# Patient Record
Sex: MALE | Race: BLACK OR AFRICAN AMERICAN | Employment: FULL TIME | URBAN - METROPOLITAN AREA
[De-identification: names, ages, dates, MRNs, and addresses within clinical notes are randomized per-mention and may not be internally consistent; named-entity substitution may affect disease eponyms.]

---

## 2019-02-05 ENCOUNTER — HOSPITAL ENCOUNTER (EMERGENCY)
Age: 44
Discharge: HOME | End: 2019-02-05
Attending: EMERGENCY MEDICINE
Payer: COMMERCIAL

## 2019-02-05 ENCOUNTER — APPOINTMENT (OUTPATIENT)
Dept: GENERAL RADIOLOGY | Age: 44
End: 2019-02-05
Attending: PHYSICIAN ASSISTANT
Payer: COMMERCIAL

## 2019-02-05 VITALS
RESPIRATION RATE: 15 BRPM | HEART RATE: 64 BPM | OXYGEN SATURATION: 99 % | DIASTOLIC BLOOD PRESSURE: 69 MMHG | TEMPERATURE: 98.2 F | SYSTOLIC BLOOD PRESSURE: 115 MMHG

## 2019-02-05 DIAGNOSIS — R00.2 PALPITATIONS: Primary | ICD-10-CM

## 2019-02-05 LAB
ALBUMIN SERPL-MCNC: 3.7 G/DL (ref 3.5–5)
ALBUMIN/GLOB SERPL: 0.9 {RATIO} (ref 1.1–2.2)
ALP SERPL-CCNC: 60 U/L (ref 45–117)
ALT SERPL-CCNC: 18 U/L (ref 12–78)
ANION GAP SERPL CALC-SCNC: 7 MMOL/L (ref 5–15)
AST SERPL-CCNC: 23 U/L (ref 15–37)
ATRIAL RATE: 62 BPM
BASOPHILS # BLD: 0 K/UL (ref 0–0.1)
BASOPHILS NFR BLD: 1 % (ref 0–1)
BILIRUB SERPL-MCNC: 0.4 MG/DL (ref 0.2–1)
BUN SERPL-MCNC: 11 MG/DL (ref 6–20)
BUN/CREAT SERPL: 12 (ref 12–20)
CALCIUM SERPL-MCNC: 8.9 MG/DL (ref 8.5–10.1)
CALCULATED P AXIS, ECG09: 84 DEGREES
CALCULATED R AXIS, ECG10: -9 DEGREES
CALCULATED T AXIS, ECG11: 40 DEGREES
CHLORIDE SERPL-SCNC: 106 MMOL/L (ref 97–108)
CO2 SERPL-SCNC: 26 MMOL/L (ref 21–32)
COMMENT, HOLDF: NORMAL
CREAT SERPL-MCNC: 0.93 MG/DL (ref 0.7–1.3)
DIAGNOSIS, 93000: NORMAL
DIFFERENTIAL METHOD BLD: ABNORMAL
EOSINOPHIL # BLD: 0.1 K/UL (ref 0–0.4)
EOSINOPHIL NFR BLD: 2 % (ref 0–7)
ERYTHROCYTE [DISTWIDTH] IN BLOOD BY AUTOMATED COUNT: 14.1 % (ref 11.5–14.5)
GLOBULIN SER CALC-MCNC: 3.9 G/DL (ref 2–4)
GLUCOSE SERPL-MCNC: 81 MG/DL (ref 65–100)
HCT VFR BLD AUTO: 38.1 % (ref 36.6–50.3)
HGB BLD-MCNC: 12 G/DL (ref 12.1–17)
IMM GRANULOCYTES # BLD AUTO: 0 K/UL (ref 0–0.04)
IMM GRANULOCYTES NFR BLD AUTO: 0 % (ref 0–0.5)
LYMPHOCYTES # BLD: 1.3 K/UL (ref 0.8–3.5)
LYMPHOCYTES NFR BLD: 29 % (ref 12–49)
MCH RBC QN AUTO: 27.3 PG (ref 26–34)
MCHC RBC AUTO-ENTMCNC: 31.5 G/DL (ref 30–36.5)
MCV RBC AUTO: 86.8 FL (ref 80–99)
MONOCYTES # BLD: 0.5 K/UL (ref 0–1)
MONOCYTES NFR BLD: 12 % (ref 5–13)
NEUTS SEG # BLD: 2.4 K/UL (ref 1.8–8)
NEUTS SEG NFR BLD: 56 % (ref 32–75)
NRBC # BLD: 0 K/UL (ref 0–0.01)
NRBC BLD-RTO: 0 PER 100 WBC
P-R INTERVAL, ECG05: 148 MS
PLATELET # BLD AUTO: 151 K/UL (ref 150–400)
PMV BLD AUTO: 10.7 FL (ref 8.9–12.9)
POTASSIUM SERPL-SCNC: 4 MMOL/L (ref 3.5–5.1)
PROT SERPL-MCNC: 7.6 G/DL (ref 6.4–8.2)
Q-T INTERVAL, ECG07: 402 MS
QRS DURATION, ECG06: 98 MS
QTC CALCULATION (BEZET), ECG08: 408 MS
RBC # BLD AUTO: 4.39 M/UL (ref 4.1–5.7)
SAMPLES BEING HELD,HOLD: NORMAL
SODIUM SERPL-SCNC: 139 MMOL/L (ref 136–145)
T4 FREE SERPL-MCNC: 1 NG/DL (ref 0.8–1.5)
TROPONIN I SERPL-MCNC: <0.05 NG/ML
TSH SERPL DL<=0.05 MIU/L-ACNC: 4.78 UIU/ML (ref 0.36–3.74)
VENTRICULAR RATE, ECG03: 62 BPM
WBC # BLD AUTO: 4.3 K/UL (ref 4.1–11.1)

## 2019-02-05 PROCEDURE — 99283 EMERGENCY DEPT VISIT LOW MDM: CPT

## 2019-02-05 PROCEDURE — 84439 ASSAY OF FREE THYROXINE: CPT

## 2019-02-05 PROCEDURE — 36415 COLL VENOUS BLD VENIPUNCTURE: CPT

## 2019-02-05 PROCEDURE — 93005 ELECTROCARDIOGRAM TRACING: CPT

## 2019-02-05 PROCEDURE — 71046 X-RAY EXAM CHEST 2 VIEWS: CPT

## 2019-02-05 PROCEDURE — 84484 ASSAY OF TROPONIN QUANT: CPT

## 2019-02-05 PROCEDURE — 85025 COMPLETE CBC W/AUTO DIFF WBC: CPT

## 2019-02-05 PROCEDURE — 84443 ASSAY THYROID STIM HORMONE: CPT

## 2019-02-05 PROCEDURE — 80053 COMPREHEN METABOLIC PANEL: CPT

## 2019-02-05 NOTE — LETTER
[address] 
[facility] [address] 
[phone] Work/School Note Date: 2/5/2019 To Whom It May concern: 
 
Jose Cruz Gee. was seen and treated today in the emergency room by the following provider(s): 
Attending Provider: Jesus Carrera MD 
Physician Assistant: CEZAR Gleason. Jose Cruz Florence may return to work on 2/6/19. Sincerely, CEZAR Giraldo

## 2019-02-05 NOTE — LETTER
[address] 
[address] [place], [place] [address] [address] 
[phone] Work/School Note Date: 2/5/2019 To Whom It May concern: 
 
Sharyn Carney may return to work on 2/6/19 Sincerely, Doyne Duane, PA

## 2019-02-05 NOTE — ED TRIAGE NOTES
2664: Patient arrives for what he describes as a \"skipped beat\" that has been going on for about 8/9 days accompanied with dizziness. Patient states that it happens once every 5 mins. Denies SOB 
 
1113: Patient verbalizes understanding of discharge instructions given by provider, Jose Long. Opportunity for questions provided. Patient in no apparent distress. Patient ambulatory upon discharge. Visit Vitals /69 (BP 1 Location: Left arm, BP Patient Position: At rest;Sitting) Pulse 64 Temp 98.2 °F (36.8 °C) Resp 15 SpO2 99%

## 2019-02-05 NOTE — ED PROVIDER NOTES
37year old male presenting to the ED for palpitations. Happening for over a week. Notes that he feels lightheaded when it happens. States that he has noticed symptoms for about 8 or 9 days. Notes that he feels symptoms currently, and notes that it has been relatively constant for a couple of days. Notes that it feels irregular at times. Feels like skipped beat. Notes an intermittent \"burning\" sensation in the chest at times. No SOB. No hemoptysis. No hx VTE. No recent immobilization. No unilateral leg pain/swelling. Admits to drinking 5-6 [facility] [employer] a day but denies any recent increase. PMHx: denies PSx: denies Social: 1ppd. No alcohol use. No illicit drug use. Palpitations Pertinent negatives include no fever, no vomiting, no cough and no shortness of breath. History reviewed. No pertinent past medical history. History reviewed. No pertinent surgical history. History reviewed. No pertinent family history. Social History Socioeconomic History  Marital status: SINGLE Spouse name: Not on file  Number of children: Not on file  Years of education: Not on file  Highest education level: Not on file Social Needs  Financial resource strain: Not on file  Food insecurity - worry: Not on file  Food insecurity - inability: Not on file  Transportation needs - medical: Not on file  Transportation needs - non-medical: Not on file Occupational History  Not on file Tobacco Use  Smoking status: Current Every Day Smoker Packs/day: 1.00  Smokeless tobacco: Never Used Substance and Sexual Activity  Alcohol use: No  
  Frequency: Never  Drug use: No  
 Sexual activity: Not on file Other Topics Concern  Not on file Social History Narrative  Not on file ALLERGIES: Patient has no known allergies. Review of Systems Constitutional: Negative for fever. HENT: Negative for congestion. Eyes: Negative for discharge and redness. Respiratory: Negative for cough and shortness of breath. Cardiovascular: Positive for palpitations. Gastrointestinal: Negative for diarrhea and vomiting. Genitourinary: Negative for difficulty urinating. Musculoskeletal: Negative for joint swelling. Skin: Negative for wound. Neurological: Negative for syncope. Psychiatric/Behavioral: Negative for behavioral problems. All other systems reviewed and are negative. Vitals:  
 02/05/19 0904 02/05/19 0957 02/05/19 1112 BP:  117/54 115/69 Pulse: 69 65 64 Resp:  26 15 Temp:   98.2 °F (36.8 °C) SpO2: 98% 98% 99% Physical Exam  
Constitutional: He appears well-developed and well-nourished. No distress. Pleasant AA male HENT:  
Right Ear: External ear normal.  
Left Ear: External ear normal.  
Eyes: Pupils are equal, round, and reactive to light. Neck: Normal range of motion. Neck supple. Cardiovascular: Normal rate, regular rhythm and normal heart sounds. Exam reveals no gallop and no friction rub. No murmur heard. Regular pulse Pulmonary/Chest: Effort normal and breath sounds normal. No respiratory distress. He has no wheezes. Abdominal: Soft. There is no tenderness. There is no guarding. Musculoskeletal: Normal range of motion. Neurological: He is alert. Skin: Skin is warm and dry. Psychiatric: He has a normal mood and affect. Nursing note and vitals reviewed. MDM Number of Diagnoses or Management Options Palpitations:  
Diagnosis management comments: 37year old male presenting for over a week of palpitations. Reports feeling of skipped beats. Pulse regular in the ED, normal EKG. Overall reassuring labs, VS.  Discussed possible causes of sensation, encouraged pt to stop drinking 5-6 [employer] [facility] sodas a day as caffeine could be contributing to symptoms. Cardiology f/u given. Amount and/or Complexity of Data Reviewed Clinical lab tests: ordered and reviewed Tests in the radiology section of CPT®: ordered and reviewed Discuss the patient with other providers: yes (Dr. Elijah Manzano, ED attending) Procedures

## 2019-02-05 NOTE — DISCHARGE INSTRUCTIONS
Patient Education     - return for new or worsening symptoms  - make a follow up with primary care and cardiology (Dr. Flaco Jaffe)  - gradually decrease your intake of Mountain Dew     Palpitations: Care Instructions  Your Care Instructions    Heart palpitations are the uncomfortable sensation that your heart is beating fast or irregularly. You might feel pounding or fluttering in your chest. It might feel like your heart is skipping a beat. Although palpitations may be caused by a heart problem, they also occur because of stress, fatigue, or use of alcohol, caffeine, or nicotine. Many medicines, including diet pills, antihistamines, decongestants, and some herbal products, can cause heart palpitations. Nearly everyone has palpitations from time to time. Depending on your symptoms, your doctor may need to do more tests to try to find the cause of your palpitations. Follow-up care is a key part of your treatment and safety. Be sure to make and go to all appointments, and call your doctor if you are having problems. It's also a good idea to know your test results and keep a list of the medicines you take. How can you care for yourself at home? · Avoid caffeine, nicotine, and excess alcohol. · Do not take illegal drugs, such as methamphetamines and cocaine. · Do not take weight loss or diet medicines unless you talk with your doctor first.  · Get plenty of sleep. · Do not overeat. · If you have palpitations again, take deep breaths and try to relax. This may slow a racing heart. · If you start to feel lightheaded, lie down to avoid injuries that might result if you pass out and fall down. · Keep a record of your palpitations and bring it to your next doctor's appointment. Write down:  ? The date and time. ? Your pulse. (If your heart is beating fast, it may be hard to count your pulse.)  ? What you were doing when the palpitations started. ? How long the palpitations lasted. ? Any other symptoms.   · If an activity causes palpitations, slow down or stop. Talk to your doctor before you do that activity again. · Take your medicines exactly as prescribed. Call your doctor if you think you are having a problem with your medicine. When should you call for help? Call 911 anytime you think you may need emergency care. For example, call if:    · You passed out (lost consciousness).     · You have symptoms of a heart attack. These may include:  ? Chest pain or pressure, or a strange feeling in the chest.  ? Sweating. ? Shortness of breath. ? Pain, pressure, or a strange feeling in the back, neck, jaw, or upper belly or in one or both shoulders or arms. ? Lightheadedness or sudden weakness. ? A fast or irregular heartbeat. After you call 911, the  may tell you to chew 1 adult-strength or 2 to 4 low-dose aspirin. Wait for an ambulance. Do not try to drive yourself.     · You have symptoms of a stroke. These may include:  ? Sudden numbness, tingling, weakness, or loss of movement in your face, arm, or leg, especially on only one side of your body. ? Sudden vision changes. ? Sudden trouble speaking. ? Sudden confusion or trouble understanding simple statements. ? Sudden problems with walking or balance. ? A sudden, severe headache that is different from past headaches.    Call your doctor now or seek immediate medical care if:    · You have heart palpitations and:  ? Are dizzy or lightheaded, or you feel like you may faint. ? Have new or increased shortness of breath.    Watch closely for changes in your health, and be sure to contact your doctor if:    · You continue to have heart palpitations. Where can you learn more? Go to http://van-mary.info/. Enter R508 in the search box to learn more about \"Palpitations: Care Instructions. \"  Current as of: July 22, 2018  Content Version: 11.9  © 8488-3154 [employer].  Care instructions adapted under license by [employer] (which disclaims liability or warranty for this information). If you have questions about a medical condition or this instruction, always ask your healthcare professional. [address] any warranty or liability for your use of this information.

## 2019-02-08 ENCOUNTER — CLINICAL SUPPORT (OUTPATIENT)
Dept: CARDIOLOGY CLINIC | Age: 44
End: 2019-02-08

## 2019-02-08 ENCOUNTER — OFFICE VISIT (OUTPATIENT)
Dept: CARDIOLOGY CLINIC | Age: 44
End: 2019-02-08

## 2019-02-08 VITALS
RESPIRATION RATE: 12 BRPM | HEART RATE: 68 BPM | WEIGHT: 161.8 LBS | OXYGEN SATURATION: 98 % | SYSTOLIC BLOOD PRESSURE: 110 MMHG | BODY MASS INDEX: 23.96 KG/M2 | HEIGHT: 69 IN | DIASTOLIC BLOOD PRESSURE: 58 MMHG

## 2019-02-08 DIAGNOSIS — R55 PRE-SYNCOPE: ICD-10-CM

## 2019-02-08 DIAGNOSIS — R00.2 PALPITATION: Primary | ICD-10-CM

## 2019-02-08 DIAGNOSIS — R00.2 PALPITATION: ICD-10-CM

## 2019-02-08 NOTE — PROGRESS NOTES
HISTORY OF PRESENT ILLNESS  Tootie Martin is a 37 y.o. male     SUMMARY:   Problem List  Date Reviewed: 2/8/2019          Codes Class Noted    Palpitation ICD-10-CM: R00.2  ICD-9-CM: 785.1  2/8/2019              No current outpatient medications on file prior to visit. No current facility-administered medications on file prior to visit. CARDIOLOGY STUDIES TO DATE:  none  Chief Complaint   Patient presents with    Palpitations     HPI :  Mr. Zechariah Jerome is a 37year-old referred by the [facility] ER for cardiac evaluation. Over the years, he has had problems with palpitations, which by description sound like they are premature atrial or ventricular contractions. For the last couple of weeks, he was having much more frequent symptoms and finally on Tuesday, it was really frequent and he actually felt like he might faint at work, so he went to the emergency room. His EKG was normal and his labs were unremarkable, except for a slightly elevated TSH. He quit smoking that day and stopped drinking [employer] that same day and things are a little bit better. He drinks no alcohol and gets no regular exercise, though he is fairly active at work and at home. There is no history of hypertension or diabetes. Cholesterol status is unknown. Family history is negative for premature coronary disease. He has some low back pain. He does have stress with a job and young family and so forth. CARDIAC ROS:   negative for chest pain, dyspnea, palpitations, orthopnea, paroxysmal nocturnal dyspnea, exertional chest pressure/discomfort, claudication, lower extremity edema    Family History   Problem Relation Age of Onset    Heart Disease Neg Hx        No past medical history on file. GENERAL ROS:  A comprehensive review of systems was negative except for that written in the HPI.     Visit Vitals  /58 (BP 1 Location: Left arm, BP Patient Position: Sitting)   Pulse 68   Resp 12   Ht 5' 9\" (1.753 m)   Wt 161 lb 12.8 oz (73.4 kg)   SpO2 98%   BMI 23.89 kg/m²       Wt Readings from Last 3 Encounters:   02/08/19 161 lb 12.8 oz (73.4 kg)   06/13/10 150 lb (68 kg)            BP Readings from Last 3 Encounters:   02/08/19 110/58   02/05/19 115/69   06/13/10 122/67       PHYSICAL EXAM  General appearance: alert, cooperative, no distress, appears stated age  Neurologic: Alert and oriented X 3  Neck: supple, symmetrical, trachea midline, no adenopathy, no carotid bruit and no JVD  Lungs: clear to auscultation bilaterally  Heart: regular rate and rhythm, S1, S2 normal, no murmur, click, rub or gallop  Abdomen: soft, non-tender. Bowel sounds normal. No masses,  no organomegaly  Extremities: extremities normal, atraumatic, no cyanosis or edema  Pulses: 2+ and symmetric      ASSESSMENT  We talked about his likely diagnosis and potential causes and the use of medications going forward if it remains problematic for him. I strongly supported his decision to cut out caffeine and tobacco.  Given the fact that he almost fainted the other day, I do think we should get an echocardiogram and provide him with an event monitor. current treatment plan is effective, no change in therapy  lab results and schedule of future lab studies reviewed with patient  reviewed diet, exercise and weight control    Encounter Diagnoses   Name Primary?  Palpitation Yes    Pre-syncope      No orders of the defined types were placed in this encounter. Follow-up Disposition:  Return in about 4 weeks (around 3/8/2019), or if symptoms worsen or fail to improve.     Mindy Sanchez MD  2/8/2019

## 2019-02-08 NOTE — PROGRESS NOTES
Dr. Gabbie Mary would like to have a 30 day event monitor mailed to patient. Dx palpitations, near syncope.

## 2019-02-13 ENCOUNTER — CLINICAL SUPPORT (OUTPATIENT)
Dept: CARDIOLOGY CLINIC | Age: 44
End: 2019-02-13

## 2019-02-13 VITALS
HEIGHT: 69 IN | WEIGHT: 166 LBS | BODY MASS INDEX: 24.59 KG/M2 | SYSTOLIC BLOOD PRESSURE: 110 MMHG | DIASTOLIC BLOOD PRESSURE: 58 MMHG

## 2019-02-13 DIAGNOSIS — R00.2 PALPITATION: ICD-10-CM

## 2019-02-14 ENCOUNTER — TELEPHONE (OUTPATIENT)
Dept: CARDIOLOGY CLINIC | Age: 44
End: 2019-02-14

## 2019-02-14 LAB
ECHO AO ASC DIAM: 2.51 CM
ECHO AO ROOT DIAM: 2.76 CM
ECHO AV MEAN GRADIENT: 5.5 MMHG
ECHO AV PEAK GRADIENT: 10 MMHG
ECHO AV PEAK VELOCITY: 157.88 CM/S
ECHO AV VTI: 31.37 CM
ECHO IVC SNIFF: 1.18 CM
ECHO LA AREA 4C: 20.9 CM2
ECHO LA MAJOR AXIS: 3.1 CM
ECHO LA TO AORTIC ROOT RATIO: 1.12
ECHO LA VOL 2C: 51.55 ML (ref 18–58)
ECHO LA VOL 4C: 59.14 ML (ref 18–58)
ECHO LA VOL BP: 62.87 ML (ref 18–58)
ECHO LA VOL/BSA BIPLANE: 32.94 ML/M2 (ref 16–28)
ECHO LA VOLUME INDEX A2C: 27.01 ML/M2 (ref 16–28)
ECHO LA VOLUME INDEX A4C: 30.99 ML/M2 (ref 16–28)
ECHO LV E' LATERAL VELOCITY: 15.15 CM/S
ECHO LV E' SEPTAL VELOCITY: 14.1 CM/S
ECHO LV EDV A2C: 105.6 ML
ECHO LV EDV A4C: 94.5 ML
ECHO LV EDV BP: 103.8 ML (ref 67–155)
ECHO LV EDV INDEX A4C: 49.5 ML/M2
ECHO LV EDV INDEX BP: 54.4 ML/M2
ECHO LV EDV NDEX A2C: 55.3 ML/M2
ECHO LV EJECTION FRACTION A2C: 52 %
ECHO LV EJECTION FRACTION A4C: 57 %
ECHO LV EJECTION FRACTION BIPLANE: 54.9 % (ref 55–100)
ECHO LV ESV A2C: 50.2 ML
ECHO LV ESV A4C: 40.3 ML
ECHO LV ESV BP: 46.8 ML (ref 22–58)
ECHO LV ESV INDEX A2C: 26.3 ML/M2
ECHO LV ESV INDEX A4C: 21.1 ML/M2
ECHO LV ESV INDEX BP: 24.5 ML/M2
ECHO LV INTERNAL DIMENSION DIASTOLIC: 4.91 CM (ref 4.2–5.9)
ECHO LV INTERNAL DIMENSION SYSTOLIC: 2.88 CM
ECHO LV IVSD: 0.84 CM (ref 0.6–1)
ECHO LV MASS 2D: 165.8 G (ref 88–224)
ECHO LV MASS INDEX 2D: 86.9 G/M2 (ref 49–115)
ECHO LV POSTERIOR WALL DIASTOLIC: 0.88 CM (ref 0.6–1)
ECHO LVOT PEAK GRADIENT: 4.4 MMHG
ECHO LVOT PEAK VELOCITY: 104.98 CM/S
ECHO LVOT VTI: 21.11 CM
ECHO MV A VELOCITY: 42.92 CM/S
ECHO MV AREA PHT: 4.3 CM2
ECHO MV E DECELERATION TIME (DT): 175.2 MS
ECHO MV E VELOCITY: 0.68 CM/S
ECHO MV E/A RATIO: 0.02
ECHO MV E/E' LATERAL: 0.04
ECHO MV E/E' RATIO (AVERAGED): 0.05
ECHO MV E/E' SEPTAL: 0.05
ECHO MV PRESSURE HALF TIME (PHT): 50.8 MS
ECHO RA MAJOR AXIS: 4.02 CM
ECHO RV INTERNAL DIMENSION: 4.35 CM
ECHO RV TAPSE: 2.94 CM (ref 1.5–2)
ECHO TV REGURGITANT MAX VELOCITY: 270.81 CM/S
ECHO TV REGURGITANT PEAK GRADIENT: 29.3 MMHG

## 2019-02-14 NOTE — TELEPHONE ENCOUNTER
----- Message from Charles Lao MD sent at 2/14/2019  3:56 PM EST -----  Heart muscle is strong and valves all functioning normally, looks great

## 2019-02-14 NOTE — TELEPHONE ENCOUNTER
Identifiers x 2. Informed of echo results. Verbalized understanding. States received heart monitor today. Inquired to need to wear monitor. Instructed that it provides different data. Verbalized understanding.

## 2019-02-19 ENCOUNTER — OFFICE VISIT (OUTPATIENT)
Dept: FAMILY MEDICINE CLINIC | Age: 44
End: 2019-02-19

## 2019-02-19 VITALS
BODY MASS INDEX: 24.35 KG/M2 | TEMPERATURE: 97.3 F | RESPIRATION RATE: 16 BRPM | WEIGHT: 164.4 LBS | HEART RATE: 63 BPM | SYSTOLIC BLOOD PRESSURE: 129 MMHG | HEIGHT: 69 IN | DIASTOLIC BLOOD PRESSURE: 65 MMHG | OXYGEN SATURATION: 97 %

## 2019-02-19 DIAGNOSIS — Z00.00 WELL ADULT EXAM: ICD-10-CM

## 2019-02-19 DIAGNOSIS — R00.2 PALPITATIONS: ICD-10-CM

## 2019-02-19 DIAGNOSIS — Z00.00 WELL ADULT EXAM: Primary | ICD-10-CM

## 2019-02-19 DIAGNOSIS — R79.89 ABNORMAL TSH: ICD-10-CM

## 2019-02-19 DIAGNOSIS — H57.9 ABNORMAL VISION SCREEN: ICD-10-CM

## 2019-02-19 NOTE — PROGRESS NOTES
Chief Complaint Patient presents with [facility] Establish Care New patient Referred by Dr Darrius Evans seeing him for palpitations Currently wearing heart monitor Would like to have follow-up thyroid test

## 2019-02-19 NOTE — PROGRESS NOTES
Subjective: Chief Complaint Patient presents with [place] Establish Care New patient He  is a 37 y.o. male who presents for evaluation of: 
New pt to est care. Not seeing PCP. Hx of palpitations and presyncope. Already worked up in [place] [address] and with Dr. Adelita Reyes. Labs (CBC, CMP, TnI, TSH, FT4) mostly ok but TSH was just slightly off though T4 was nml. Echo came back with: EF 56 - 60%, mild LA dilation, and trace MVR. Currently, wearing holter monitor. Does have Fhx of thyroid problems in Dad and bradycardia in mom leading to pacemaker. For his palpitations, he noted being light-headed/dizzy during episodes of palpitations. These would last about 30 seconds or so. Was having these about every 10 minutes but not having many episodes since wearing monitor. Was drinking about 6 mountain dews per day and then stopped drinking these around 2/12/19. Also stopped smoking tobacco last week as well. Was smoking 1 ppd for about 2-3 yrs and before this was about a 1/2 ppd x 10 yrs. Occ feels chest heaviness for a few seconds separately from palpitations. Does have some stressors - over the past few years, has had about 4-5 family members pass away (MI and cancers) and currently has a family member in the hospital.  Has normal stressors with kids, marriage, and finances. Sleeps well with about 6-7 hours per night. Recently notes waking up during the night a few times. ROS: 
Constitutional: negative except for fevers, chills and fatigue Eyes: negative for visual disturbance Ears, nose, mouth, throat, and face: negative for hearing loss and sore throat Respiratory: negative for cough or dyspnea on exertion Cardiovascular: per HPI Gastrointestinal: negative for nausea, vomiting, change in bowel habits, diarrhea and abdominal pain Genitourinary:negative for frequency and dysuria Integument/breast: + tinea Hematologic/lymphatic: negative for easy bruising and bleeding Musculoskeletal:negative for myalgias and muscle weakness Neurological: negative for headaches and dizziness Behavioral/Psych: per HPI History reviewed. No pertinent past medical history. History reviewed. No pertinent surgical history. No current outpatient medications on file prior to visit. No current facility-administered medications on file prior to visit. Objective:  
 
Vitals:  
 02/19/19 1315 BP: 129/65 Pulse: 63 Resp: 16 Temp: 97.3 °F (36.3 °C) TempSrc: Oral  
SpO2: 97% Weight: 164 lb 6.4 oz (74.6 kg) Height: 5' 8.5\" (1.74 m) Physical Examination: 
General appearance - alert, well appearing, and in no distress Ears - bilat nml TMs Eyes - sclera anicteric Nose - normal and patent, no erythema, discharge or polyps Mouth - mucous membranes moist, pharynx normal without lesions Neck - supple, no significant adenopathy Lymphatics - no palpable lymphadenopathy, no hepatosplenomegaly Chest - clear to auscultation, no wheezes, rales or rhonchi, symmetric air entry Heart - normal rate, regular rhythm, normal S1, S2, no murmurs, rubs, clicks or gallops Abdomen - soft, nontender, nondistended, no masses or organomegaly  - not indicated Neurological - alert, oriented, normal speech, no focal findings or movement disorder noted Musculoskeletal - no joint tenderness, deformity or swelling Extremities - no edema Skin - no rashes or suspicious lesions Psych - nml mood and affect Assessment/ Plan:  
Diagnoses and all orders for this visit: 
 
1. Well adult exam - rechecking labs in a few weeks, encouraged ct smoking cessation -     LIPID PANEL; Future 
-     HEMOGLOBIN A1C WITH EAG; Future 
-     TSH 3RD GENERATION; Future -     METABOLIC PANEL, COMPREHENSIVE; Future 
-     HIV 1/2 AG/AB, 4TH GENERATION,W RFLX CONFIRM; Future -     RPR; Future -     CHLAMYDIA/GC PCR; Future -     URINALYSIS W/ RFLX MICROSCOPIC; Future 2. Palpitations - likely caffeine and anxiety/stress related. Will have pt monitor sx. 3. Abnormal TSH - rechecking labs 
-     TSH 3RD GENERATION; Future 4. Abnormal vision screen - to check in with eye doctor. I have discussed the diagnosis with the patient and the intended plan as seen in the above orders. The patient has received an after-visit summary and questions were answered concerning future plans. I have discussed medication side effects and warnings with the patient as well. The patient verbalizes understanding and agreement with the plan. Follow-up Disposition: 
Return in about 3 months (around 5/19/2019).

## 2019-02-19 NOTE — PATIENT INSTRUCTIONS

## 2019-03-12 ENCOUNTER — OFFICE VISIT (OUTPATIENT)
Dept: CARDIOLOGY CLINIC | Age: 44
End: 2019-03-12

## 2019-03-12 VITALS
WEIGHT: 167 LBS | OXYGEN SATURATION: 98 % | SYSTOLIC BLOOD PRESSURE: 112 MMHG | HEIGHT: 68 IN | RESPIRATION RATE: 14 BRPM | HEART RATE: 76 BPM | DIASTOLIC BLOOD PRESSURE: 60 MMHG | BODY MASS INDEX: 25.31 KG/M2

## 2019-03-12 DIAGNOSIS — R00.2 PALPITATION: Primary | ICD-10-CM

## 2019-03-12 NOTE — PROGRESS NOTES
HISTORY OF PRESENT ILLNESS  Chucho Tobin is a 37 y.o. male     SUMMARY:   Problem List  Date Reviewed: 3/12/2019          Codes Class Noted    Palpitation ICD-10-CM: R00.2  ICD-9-CM: 785.1  2/8/2019              No current outpatient medications on file prior to visit. No current facility-administered medications on file prior to visit. CARDIOLOGY STUDIES TO DATE:  2/19 lae, otherwise normal echo  2/19 event monitor, no abnormalities      Chief Complaint   Patient presents with    Irregular Heart Beat     HPI :  Mr. Claudia Jiang has not had any further symptoms since he stopped smoking and drinking large amounts of [facility]. His event monitor showed nothing and his echocardiogram looked good and we reviewed all of that. CARDIAC ROS:   negative for chest pain, dyspnea, syncope, orthopnea, paroxysmal nocturnal dyspnea, exertional chest pressure/discomfort, claudication, lower extremity edema    Family History   Problem Relation Age of Onset   [facility] Pac[employer] Mother     Dementia Father     Other Sister         Diverticulitis    Heart Disease Neg Hx        No past medical history on file. GENERAL ROS:  A comprehensive review of systems was negative except for that written in the HPI.     Visit Vitals  /60 (BP 1 Location: Left arm, BP Patient Position: Sitting)   Pulse 76   Resp 14   Ht 5' 8\" (1.727 m)   Wt 167 lb (75.8 kg)   SpO2 98%   BMI 25.39 kg/m²       Wt Readings from Last 3 Encounters:   03/12/19 167 lb (75.8 kg)   02/19/19 164 lb 6.4 oz (74.6 kg)   02/13/19 166 lb (75.3 kg)            BP Readings from Last 3 Encounters:   03/12/19 112/60   02/19/19 129/65   02/13/19 110/58       PHYSICAL EXAM  General appearance: alert, cooperative, no distress, appears stated age  Neurologic: Alert and oriented X 3  Neck: supple, symmetrical, trachea midline, no adenopathy, no carotid bruit and no JVD  Lungs: clear to auscultation bilaterally  Heart: regular rate and rhythm, S1, S2 normal, no murmur, click, rub or gallop  Extremities: extremities normal, atraumatic, no cyanosis or edema      ASSESSMENT  Mr. Gerardo Mackay is stable and asymptomatic at this point and needs no further cardiac testing at this time. We did talk about symptoms that would prompt an urgent return visit here or a call to 911. current treatment plan is effective, no change in therapy  lab results and schedule of future lab studies reviewed with patient  reviewed diet, exercise and weight control    Encounter Diagnoses   Name Primary?  Palpitation Yes     No orders of the defined types were placed in this encounter. Follow-up Disposition:  Return if symptoms worsen or fail to improve.     Evita Alvarez MD  3/12/2019

## 2019-03-13 LAB
ALBUMIN SERPL-MCNC: 4.4 G/DL (ref 3.5–5.5)
ALBUMIN/GLOB SERPL: 1.5 {RATIO} (ref 1.2–2.2)
ALP SERPL-CCNC: 64 IU/L (ref 39–117)
ALT SERPL-CCNC: 21 IU/L (ref 0–44)
APPEARANCE UR: CLEAR
AST SERPL-CCNC: 22 IU/L (ref 0–40)
BILIRUB SERPL-MCNC: 0.5 MG/DL (ref 0–1.2)
BILIRUB UR QL STRIP: NEGATIVE
BUN SERPL-MCNC: 10 MG/DL (ref 6–24)
BUN/CREAT SERPL: 10 (ref 9–20)
C TRACH RRNA SPEC QL NAA+PROBE: NEGATIVE
CALCIUM SERPL-MCNC: 9.4 MG/DL (ref 8.7–10.2)
CHLORIDE SERPL-SCNC: 101 MMOL/L (ref 96–106)
CHOLEST SERPL-MCNC: 176 MG/DL (ref 100–199)
CO2 SERPL-SCNC: 24 MMOL/L (ref 20–29)
COLOR UR: YELLOW
CREAT SERPL-MCNC: 1.04 MG/DL (ref 0.76–1.27)
EST. AVERAGE GLUCOSE BLD GHB EST-MCNC: 123 MG/DL
GLOBULIN SER CALC-MCNC: 2.9 G/DL (ref 1.5–4.5)
GLUCOSE SERPL-MCNC: 76 MG/DL (ref 65–99)
GLUCOSE UR QL: NEGATIVE
HBA1C MFR BLD: 5.9 % (ref 4.8–5.6)
HDLC SERPL-MCNC: 67 MG/DL
HGB UR QL STRIP: NEGATIVE
HIV 1+2 AB+HIV1 P24 AG SERPL QL IA: NON REACTIVE
KETONES UR QL STRIP: NEGATIVE
LDLC SERPL CALC-MCNC: 98 MG/DL (ref 0–99)
LEUKOCYTE ESTERASE UR QL STRIP: NEGATIVE
MICRO URNS: NORMAL
N GONORRHOEA RRNA SPEC QL NAA+PROBE: NEGATIVE
NITRITE UR QL STRIP: NEGATIVE
PH UR STRIP: 7.5 [PH] (ref 5–7.5)
POTASSIUM SERPL-SCNC: 4.2 MMOL/L (ref 3.5–5.2)
PROT SERPL-MCNC: 7.3 G/DL (ref 6–8.5)
PROT UR QL STRIP: NEGATIVE
RPR SER QL: NON REACTIVE
SODIUM SERPL-SCNC: 140 MMOL/L (ref 134–144)
SP GR UR: 1.01 (ref 1–1.03)
TRIGL SERPL-MCNC: 56 MG/DL (ref 0–149)
TSH SERPL DL<=0.005 MIU/L-ACNC: 4.59 UIU/ML (ref 0.45–4.5)
UROBILINOGEN UR STRIP-MCNC: 0.2 MG/DL (ref 0.2–1)
VLDLC SERPL CALC-MCNC: 11 MG/DL (ref 5–40)

## 2020-10-26 ENCOUNTER — TELEPHONE (OUTPATIENT)
Dept: FAMILY MEDICINE CLINIC | Age: 45
End: 2020-10-26

## 2020-11-10 ENCOUNTER — HOSPITAL ENCOUNTER (OUTPATIENT)
Dept: GENERAL RADIOLOGY | Age: 45
Discharge: HOME | End: 2020-11-10
Payer: COMMERCIAL

## 2020-11-10 ENCOUNTER — OFFICE VISIT (OUTPATIENT)
Dept: FAMILY MEDICINE CLINIC | Age: 45
End: 2020-11-10
Payer: COMMERCIAL

## 2020-11-10 VITALS
HEIGHT: 68 IN | BODY MASS INDEX: 28.7 KG/M2 | RESPIRATION RATE: 16 BRPM | WEIGHT: 189.4 LBS | OXYGEN SATURATION: 99 % | DIASTOLIC BLOOD PRESSURE: 60 MMHG | HEART RATE: 75 BPM | SYSTOLIC BLOOD PRESSURE: 122 MMHG | TEMPERATURE: 98.2 F

## 2020-11-10 DIAGNOSIS — M54.9 UPPER BACK PAIN: ICD-10-CM

## 2020-11-10 DIAGNOSIS — R82.90 CLOUDY URINE: ICD-10-CM

## 2020-11-10 DIAGNOSIS — Z12.11 COLON CANCER SCREENING: ICD-10-CM

## 2020-11-10 DIAGNOSIS — Z00.00 WELL ADULT EXAM: Primary | ICD-10-CM

## 2020-11-10 PROCEDURE — 72050 X-RAY EXAM NECK SPINE 4/5VWS: CPT

## 2020-11-10 PROCEDURE — 99396 PREV VISIT EST AGE 40-64: CPT | Performed by: FAMILY MEDICINE

## 2020-11-10 RX ORDER — MELOXICAM 15 MG/1
15 TABLET ORAL
Qty: 30 TAB | Refills: 0 | Status: SHIPPED | OUTPATIENT
Start: 2020-11-10 | End: 2020-12-15 | Stop reason: ALTCHOICE

## 2020-11-10 NOTE — PROGRESS NOTES
Chief Complaint   Patient presents with    Back Pain     upper  and neck   1. Have you been to the ER, urgent care clinic since your last visit? Hospitalized since your last visit? Yes Where: Urgent Care    2. Have you seen or consulted any other health care providers outside of the [address] [place] [address] since your last visit? Include any pap smears or colon screening.  No   Urine cloudy at times

## 2020-11-10 NOTE — PROGRESS NOTES
Subjective:     Chief Complaint   Patient presents with    Back Pain     upper  and neck        He  is a 39 y.o. male who presents for evaluation of:    Having upper back pain x 3 weeks. No injury/event. Did get new furniture and worse sitting in 1 of these chairs. May be worse with looking down. No other triggers. APAP has helped slightly. Went to [facility] last week and did not find anything. Does endorse a lot of stress. Wife dx with breast ca over a year ago. Mom had breast ca. Cousin  recently. Coworker  recently too. Not exercising. ROS:  Constitutional: negative for fevers, chills and fatigue  Eyes: negative for visual disturbance  Ears, nose, mouth, throat, and face: negative for hearing loss and sore throat  Respiratory: negative for cough or dyspnea on exertion  Cardiovascular: negative for chest pain, dyspnea, palpitations, fatigue  Gastrointestinal: negative for nausea, vomiting, change in bowel habits, diarrhea and abdominal pain  Genitourinary:negative for frequency and dysuria  Integument/breast: negative for rash and skin lesion(s)  Hematologic/lymphatic: negative for easy bruising and bleeding  Musculoskeletal:negative for myalgias and muscle weakness  Neurological: negative for headaches and dizziness  Behavioral/Psych: Per HPI    History reviewed. No pertinent past medical history. History reviewed. No pertinent surgical history. No current outpatient medications on file prior to visit. No current facility-administered medications on file prior to visit.          Objective:     Vitals:    11/10/20 1203   BP: 122/60   Pulse: 75   Resp: 16   Temp: 98.2 °F (36.8 °C)   TempSrc: Temporal   SpO2: 99%   Weight: 189 lb 6.4 oz (85.9 kg)   Height: 5' 8\" (1.727 m)     Physical Examination:  General appearance - alert, well appearing, and in no distress  Ears - bilat nml TMs  Eyes - sclera anicteric  Neck - supple, no significant adenopathy  Lymphatics - no palpable lymphadenopathy, no hepatosplenomegaly  Chest - clear to auscultation, no wheezes, rales or rhonchi, symmetric air entry  Heart - normal rate, regular rhythm, normal S1, S2, no murmurs, rubs, clicks or gallops  Abdomen - soft, nontender, nondistended, no masses or organomegaly   - not indicated  Neurological - alert, oriented, normal speech, no focal findings or movement disorder noted  Musculoskeletal - no joint tenderness, deformity or swelling  Extremities - no edema  Skin - no rashes or suspicious lesions  Psych - nml mood and affect    Assessment/ Plan:   Diagnoses and all orders for this visit:    1. Well adult exam  -     HEMOGLOBIN A1C WITH EAG  -     LIPID PANEL  -     METABOLIC PANEL, COMPREHENSIVE  -     TSH 3RD GENERATION  -     CBC W/O DIFF  -     REFERRAL TO GASTROENTEROLOGY    2. Upper back paingiven ongoing pain, will get x-rays and use a trial of Mobic. Discussed potentially going to physical therapy and will hold off on this now  -     XR SPINE CERV 4 OR 5 V; Future  -     meloxicam (MOBIC) 15 mg tablet; Take 1 Tab by mouth daily (after breakfast). Take x 1 week and then stop. May use daily after this as needed. 3. Colon cancer screening  -     REFERRAL TO GASTROENTEROLOGY    4. Cloudy urineencouraged increased water intake  -     URINALYSIS W/ RFLX MICROSCOPIC    I have discussed the diagnosis with the patient and the intended plan as seen in the above orders. The patient has received an after-visit summary and questions were answered concerning future plans. I have discussed medication side effects and warnings with the patient as well. The patient verbalizes understanding and agreement with the plan. Follow-up and Dispositions    · Return in about 6 weeks (around 12/22/2020), or if symptoms worsen or fail to improve.

## 2020-11-11 LAB
APPEARANCE UR: CLEAR
BILIRUB UR QL STRIP: NEGATIVE
COLOR UR: YELLOW
GLUCOSE UR QL: NEGATIVE
HGB UR QL STRIP: NEGATIVE
KETONES UR QL STRIP: NEGATIVE
LEUKOCYTE ESTERASE UR QL STRIP: NEGATIVE
MICRO URNS: NORMAL
NITRITE UR QL STRIP: NEGATIVE
PH UR STRIP: 6.5 [PH] (ref 5–7.5)
PROT UR QL STRIP: NEGATIVE
SP GR UR: 1.03 (ref 1–1.03)
UROBILINOGEN UR STRIP-MCNC: 1 MG/DL (ref 0.2–1)

## 2020-11-19 LAB
ALBUMIN SERPL-MCNC: 4.5 G/DL (ref 4–5)
ALBUMIN/GLOB SERPL: 1.5 {RATIO} (ref 1.2–2.2)
ALP SERPL-CCNC: 68 IU/L (ref 39–117)
ALT SERPL-CCNC: 19 IU/L (ref 0–44)
AST SERPL-CCNC: 27 IU/L (ref 0–40)
BILIRUB SERPL-MCNC: 0.5 MG/DL (ref 0–1.2)
BUN SERPL-MCNC: 13 MG/DL (ref 6–24)
BUN/CREAT SERPL: 12 (ref 9–20)
CALCIUM SERPL-MCNC: 9.4 MG/DL (ref 8.7–10.2)
CHLORIDE SERPL-SCNC: 101 MMOL/L (ref 96–106)
CHOLEST SERPL-MCNC: 190 MG/DL (ref 100–199)
CO2 SERPL-SCNC: 26 MMOL/L (ref 20–29)
CREAT SERPL-MCNC: 1.09 MG/DL (ref 0.76–1.27)
ERYTHROCYTE [DISTWIDTH] IN BLOOD BY AUTOMATED COUNT: 13.1 % (ref 11.6–15.4)
EST. AVERAGE GLUCOSE BLD GHB EST-MCNC: 128 MG/DL
GLOBULIN SER CALC-MCNC: 3 G/DL (ref 1.5–4.5)
GLUCOSE SERPL-MCNC: 91 MG/DL (ref 65–99)
HBA1C MFR BLD: 6.1 % (ref 4.8–5.6)
HCT VFR BLD AUTO: 40.7 % (ref 37.5–51)
HDLC SERPL-MCNC: 65 MG/DL
HGB BLD-MCNC: 12.7 G/DL (ref 13–17.7)
LDLC SERPL CALC-MCNC: 114 MG/DL (ref 0–99)
MCH RBC QN AUTO: 27.6 PG (ref 26.6–33)
MCHC RBC AUTO-ENTMCNC: 31.2 G/DL (ref 31.5–35.7)
MCV RBC AUTO: 89 FL (ref 79–97)
PLATELET # BLD AUTO: 202 X10E3/UL (ref 150–450)
POTASSIUM SERPL-SCNC: 4.1 MMOL/L (ref 3.5–5.2)
PROT SERPL-MCNC: 7.5 G/DL (ref 6–8.5)
RBC # BLD AUTO: 4.6 X10E6/UL (ref 4.14–5.8)
SODIUM SERPL-SCNC: 139 MMOL/L (ref 134–144)
TRIGL SERPL-MCNC: 57 MG/DL (ref 0–149)
TSH SERPL DL<=0.005 MIU/L-ACNC: 6.7 UIU/ML (ref 0.45–4.5)
VLDLC SERPL CALC-MCNC: 11 MG/DL (ref 5–40)
WBC # BLD AUTO: 4.1 X10E3/UL (ref 3.4–10.8)

## 2020-12-15 ENCOUNTER — OFFICE VISIT (OUTPATIENT)
Dept: FAMILY MEDICINE CLINIC | Age: 45
End: 2020-12-15
Payer: COMMERCIAL

## 2020-12-15 VITALS
WEIGHT: 186.2 LBS | OXYGEN SATURATION: 98 % | HEART RATE: 68 BPM | SYSTOLIC BLOOD PRESSURE: 106 MMHG | BODY MASS INDEX: 28.22 KG/M2 | HEIGHT: 68 IN | RESPIRATION RATE: 16 BRPM | TEMPERATURE: 97.5 F | DIASTOLIC BLOOD PRESSURE: 66 MMHG

## 2020-12-15 DIAGNOSIS — Z12.11 COLON CANCER SCREENING: ICD-10-CM

## 2020-12-15 DIAGNOSIS — M50.30 DDD (DEGENERATIVE DISC DISEASE), CERVICAL: Primary | ICD-10-CM

## 2020-12-15 PROCEDURE — 99213 OFFICE O/P EST LOW 20 MIN: CPT | Performed by: FAMILY MEDICINE

## 2020-12-15 NOTE — PROGRESS NOTES
Subjective:     Chief Complaint   Patient presents with    Follow-up     6 week         He  is a 39 y.o. male who presents for evaluation of:    Having upper back pain and seemed to improve. This morning began having some mild pain on right side of neck. Reviewed C-spine x-rays with patient showing degenerative disc disease. Does endorse a lot of stress. Wife dx with breast ca over a year ago. Mom had breast ca. Cousin  recently. Coworker  recently too. Not exercising still. ROS  Gen - no fever/chills  Resp - no dyspnea or cough  CV - no chest pain or ONEAL  Rest per HPI    History reviewed. No pertinent past medical history. History reviewed. No pertinent surgical history. Current Outpatient Medications on File Prior to Visit   Medication Sig Dispense Refill    [DISCONTINUED] meloxicam (MOBIC) 15 mg tablet Take 1 Tab by mouth daily (after breakfast). Take x 1 week and then stop. May use daily after this as needed. 30 Tab 0     No current facility-administered medications on file prior to visit. Objective:     Vitals:    12/15/20 1141   BP: 106/66   Pulse: 68   Resp: 16   Temp: 97.5 °F (36.4 °C)   TempSrc: Temporal   SpO2: 98%   Weight: 186 lb 3.2 oz (84.5 kg)   Height: 5' 8\" (1.727 m)     Physical Examination:  General appearance - alert, well appearing, and in no distress  Eyes -sclera anicteric  Neck - supple, no significant adenopathy, no thyromegaly. Tight traps bilaterally. Mild pain with range of motion on right side of C-spine paraspinal muscles.   Chest - clear to auscultation, no wheezes, rales or rhonchi, symmetric air entry  Heart - normal rate, regular rhythm, normal S1, S2, no murmurs, rubs, clicks or gallops  Neurological - alert, oriented, normal speech, no focal findings or movement disorder noted  Extr - no edema  Psych - normal mood and affect      Assessment/ Plan:   Diagnoses and all orders for this visit:    1. DDD (degenerative disc disease), cervicalmild symptoms. Most recent flare resolved. Encouraged regular stretching, heat, exercise and stress management. 2. Colon cancer screening  -     REFERRAL TO GASTROENTEROLOGY    I have discussed the diagnosis with the patient and the intended plan as seen in the above orders. The patient has received an after-visit summary and questions were answered concerning future plans. I have discussed medication side effects and warnings with the patient as well. The patient verbalizes understanding and agreement with the plan. Follow-up and Dispositions    · Return in about 6 months (around 6/15/2021), or if symptoms worsen or fail to improve.

## 2020-12-15 NOTE — PROGRESS NOTES
Chief Complaint   Patient presents with    Follow-up     6 week    1. Have you been to the ER, urgent care clinic since your last visit? Hospitalized since your last visit? No    2. Have you seen or consulted any other health care providers outside of the [facility] since your last visit? Include any pap smears or colon screening.  No

## 2021-07-13 ENCOUNTER — VIRTUAL VISIT (OUTPATIENT)
Dept: FAMILY MEDICINE CLINIC | Age: 46
End: 2021-07-13

## 2021-07-13 DIAGNOSIS — G89.29 CHRONIC LOW BACK PAIN WITHOUT SCIATICA, UNSPECIFIED BACK PAIN LATERALITY: ICD-10-CM

## 2021-07-13 DIAGNOSIS — M54.50 CHRONIC LOW BACK PAIN WITHOUT SCIATICA, UNSPECIFIED BACK PAIN LATERALITY: ICD-10-CM

## 2021-07-13 DIAGNOSIS — R20.2 PARESTHESIA: Primary | ICD-10-CM

## 2021-07-13 NOTE — PROGRESS NOTES
Manuelito Perry (: 1975) is a 55 y.o. male, established patient, here for evaluation of the following chief complaint(s):   Numbness (Right inner thigh)       ASSESSMENT/PLAN:  Below is the assessment and plan developed based on review of pertinent labs, studies, and medications. 1. Paresthesia      No follow-ups on file. SUBJECTIVE/OBJECTIVE:  Reports having some inner thigh numbness - comes and goes. Better with rubbing/massage. Using stool at work that does press in this area. Not using any meds for this. Occ gets tingling in fingers or feet too. Does have significant stressors with his wife having to start chemo again - started about 2 weeks ago. ROS per HPI    No flowsheet data found. Physical exam:  General appearance - alert, well appearing, and in no distress  Eyes -sclera anicteric, no discharge  HEENT normocephalic, atraumatic, moist mucous membranes, no visualized neck mass  Chest -normal respiratory effort, no visualized signs of respiratory distress  Neurological - alert, awake, normal speech, no focal findings or movement disorder noted  Psych - normal mood and affect  Skin no apparent lesions    On this date 2021 I have spent 20 minutes reviewing previous notes, test results and face to face (virtual) with the patient discussing the diagnosis and importance of compliance with the treatment plan as well as documenting on the day of the visit. Manuelito Perry, was evaluated through a synchronous (real-time) audio-video encounter. The patient (or guardian if applicable) is aware that this is a billable service. Verbal consent to proceed has been obtained within the past 12 months. The visit was conducted pursuant to the emergency declaration under the [address], [address] [place] [address] authority and the [employer] General Act.   Patient identification was verified, and a caregiver was present when appropriate. The patient was located in a state where the provider was credentialed to provide care. An electronic signature was used to authenticate this note.   -- Erwin Carrera MD

## 2021-07-13 NOTE — PROGRESS NOTES
Chief Complaint   Patient presents with    Numbness     Right inner thigh   1. Have you been to the ER, urgent care clinic since your last visit? Hospitalized since your last visit? No    2. Have you seen or consulted any other health care providers outside of the [address] [place] [address] since your last visit? Include any pap smears or colon screening.  No     Stressors wife restarting Chemo

## 2021-07-15 RX ORDER — MELOXICAM 15 MG/1
15 TABLET ORAL
Qty: 30 TABLET | Refills: 0 | Status: SHIPPED | OUTPATIENT
Start: 2021-07-15 | End: 2021-08-05 | Stop reason: ALTCHOICE

## 2021-07-23 ENCOUNTER — HOSPITAL ENCOUNTER (OUTPATIENT)
Dept: GENERAL RADIOLOGY | Age: 46
Discharge: HOME | End: 2021-07-23
Payer: COMMERCIAL

## 2021-07-23 DIAGNOSIS — G89.29 CHRONIC LOW BACK PAIN WITHOUT SCIATICA, UNSPECIFIED BACK PAIN LATERALITY: ICD-10-CM

## 2021-07-23 DIAGNOSIS — R20.2 PARESTHESIA: ICD-10-CM

## 2021-07-23 DIAGNOSIS — M54.50 CHRONIC LOW BACK PAIN WITHOUT SCIATICA, UNSPECIFIED BACK PAIN LATERALITY: ICD-10-CM

## 2021-07-23 PROCEDURE — 72100 X-RAY EXAM L-S SPINE 2/3 VWS: CPT

## 2021-07-26 ENCOUNTER — HOSPITAL ENCOUNTER (EMERGENCY)
Age: 46
Discharge: HOME | End: 2021-07-26
Attending: EMERGENCY MEDICINE
Payer: COMMERCIAL

## 2021-07-26 VITALS
HEIGHT: 69 IN | DIASTOLIC BLOOD PRESSURE: 65 MMHG | SYSTOLIC BLOOD PRESSURE: 122 MMHG | TEMPERATURE: 98.5 F | BODY MASS INDEX: 26.91 KG/M2 | OXYGEN SATURATION: 99 % | WEIGHT: 181.66 LBS | RESPIRATION RATE: 16 BRPM | HEART RATE: 79 BPM

## 2021-07-26 DIAGNOSIS — R11.0 NAUSEA WITHOUT VOMITING: Primary | ICD-10-CM

## 2021-07-26 LAB
ALBUMIN SERPL-MCNC: 3.7 G/DL (ref 3.5–5)
ALBUMIN/GLOB SERPL: 0.9 {RATIO} (ref 1.1–2.2)
ALP SERPL-CCNC: 51 U/L (ref 45–117)
ALT SERPL-CCNC: 30 U/L (ref 12–78)
ANION GAP SERPL CALC-SCNC: 1 MMOL/L (ref 5–15)
APPEARANCE UR: CLEAR
AST SERPL-CCNC: 22 U/L (ref 15–37)
BACTERIA URNS QL MICRO: NEGATIVE /HPF
BASOPHILS # BLD: 0 K/UL (ref 0–0.1)
BASOPHILS NFR BLD: 0 % (ref 0–1)
BILIRUB SERPL-MCNC: 0.4 MG/DL (ref 0.2–1)
BILIRUB UR QL CFM: NEGATIVE
BUN SERPL-MCNC: 12 MG/DL (ref 6–20)
BUN/CREAT SERPL: 12 (ref 12–20)
CALCIUM SERPL-MCNC: 8.8 MG/DL (ref 8.5–10.1)
CHLORIDE SERPL-SCNC: 107 MMOL/L (ref 97–108)
CO2 SERPL-SCNC: 30 MMOL/L (ref 21–32)
COLOR UR: ABNORMAL
CREAT SERPL-MCNC: 0.97 MG/DL (ref 0.7–1.3)
DIFFERENTIAL METHOD BLD: ABNORMAL
EOSINOPHIL # BLD: 0.1 K/UL (ref 0–0.4)
EOSINOPHIL NFR BLD: 3 % (ref 0–7)
EPITH CASTS URNS QL MICRO: ABNORMAL /LPF
ERYTHROCYTE [DISTWIDTH] IN BLOOD BY AUTOMATED COUNT: 13.2 % (ref 11.5–14.5)
GLOBULIN SER CALC-MCNC: 4.2 G/DL (ref 2–4)
GLUCOSE SERPL-MCNC: 99 MG/DL (ref 65–100)
GLUCOSE UR STRIP.AUTO-MCNC: NEGATIVE MG/DL
HCT VFR BLD AUTO: 39.5 % (ref 36.6–50.3)
HGB BLD-MCNC: 12.4 G/DL (ref 12.1–17)
HGB UR QL STRIP: NEGATIVE
HYALINE CASTS URNS QL MICRO: ABNORMAL /LPF (ref 0–5)
IMM GRANULOCYTES # BLD AUTO: 0 K/UL (ref 0–0.04)
IMM GRANULOCYTES NFR BLD AUTO: 0 % (ref 0–0.5)
KETONES UR QL STRIP.AUTO: ABNORMAL MG/DL
LEUKOCYTE ESTERASE UR QL STRIP.AUTO: NEGATIVE
LIPASE SERPL-CCNC: 128 U/L (ref 73–393)
LYMPHOCYTES # BLD: 1.2 K/UL (ref 0.8–3.5)
LYMPHOCYTES NFR BLD: 33 % (ref 12–49)
MCH RBC QN AUTO: 27.4 PG (ref 26–34)
MCHC RBC AUTO-ENTMCNC: 31.4 G/DL (ref 30–36.5)
MCV RBC AUTO: 87.4 FL (ref 80–99)
MONOCYTES # BLD: 0.5 K/UL (ref 0–1)
MONOCYTES NFR BLD: 13 % (ref 5–13)
NEUTS SEG # BLD: 1.9 K/UL (ref 1.8–8)
NEUTS SEG NFR BLD: 51 % (ref 32–75)
NITRITE UR QL STRIP.AUTO: NEGATIVE
NRBC # BLD: 0 K/UL (ref 0–0.01)
NRBC BLD-RTO: 0 PER 100 WBC
PH UR STRIP: 5.5 [PH] (ref 5–8)
PLATELET # BLD AUTO: 185 K/UL (ref 150–400)
PMV BLD AUTO: 10.7 FL (ref 8.9–12.9)
POTASSIUM SERPL-SCNC: 3.9 MMOL/L (ref 3.5–5.1)
PROT SERPL-MCNC: 7.9 G/DL (ref 6.4–8.2)
PROT UR STRIP-MCNC: NEGATIVE MG/DL
RBC # BLD AUTO: 4.52 M/UL (ref 4.1–5.7)
RBC #/AREA URNS HPF: ABNORMAL /HPF (ref 0–5)
SODIUM SERPL-SCNC: 138 MMOL/L (ref 136–145)
SP GR UR REFRACTOMETRY: 1.03 (ref 1–1.03)
UA: UC IF INDICATED,UAUC: ABNORMAL
UROBILINOGEN UR QL STRIP.AUTO: 0.2 EU/DL (ref 0.2–1)
WBC # BLD AUTO: 3.8 K/UL (ref 4.1–11.1)
WBC URNS QL MICRO: ABNORMAL /HPF (ref 0–4)

## 2021-07-26 PROCEDURE — 83690 ASSAY OF LIPASE: CPT

## 2021-07-26 PROCEDURE — 85025 COMPLETE CBC W/AUTO DIFF WBC: CPT

## 2021-07-26 PROCEDURE — 81001 URINALYSIS AUTO W/SCOPE: CPT

## 2021-07-26 PROCEDURE — 36415 COLL VENOUS BLD VENIPUNCTURE: CPT

## 2021-07-26 PROCEDURE — 80053 COMPREHEN METABOLIC PANEL: CPT

## 2021-07-26 PROCEDURE — 99284 EMERGENCY DEPT VISIT MOD MDM: CPT

## 2021-07-26 RX ORDER — SUCRALFATE 1 G/1
1 TABLET ORAL 4 TIMES DAILY
Qty: 30 TABLET | Refills: 0 | Status: SHIPPED | OUTPATIENT
Start: 2021-07-26 | End: 2021-08-05 | Stop reason: ALTCHOICE

## 2021-07-26 RX ORDER — PHENOL/SODIUM PHENOLATE
20 AEROSOL, SPRAY (ML) MUCOUS MEMBRANE DAILY
Qty: 30 TABLET | Refills: 0 | Status: SHIPPED | OUTPATIENT
Start: 2021-07-26 | End: 2021-12-15 | Stop reason: SDUPTHER

## 2021-07-26 NOTE — ED PROVIDER NOTES
EMERGENCY DEPARTMENT HISTORY AND PHYSICAL EXAM      Date: 7/26/2021  Patient Name: Angeles Newton. History of Presenting Illness     Chief Complaint   Patient presents with    Abdominal Pain     Abd pain with diarrhea off and on for about a week. Sometimes feels nauseous but has not vomited.  Nausea    Diarrhea       History Provided By: Patient    HPI: Angeles Newton., 55 y.o. male presents to the ED with cc of nausea associated with defecation and eating for the past week. Patient states he will intermittent epigastric and periumbilical abdominal pain. He states sometimes the pain will go into his back as well. He denies any rectal bleeding or dark stools. He states his stools have been soft with intermittent diarrhea. He denies any associated vomiting or urinary symptoms. He has not had any fevers or any similar episodes in the past.  He is sexually active with one partner as he is . He had been having some right thigh pain when he went to see his primary care doctor on July 13 and was started on meloxicam, but he stopped that 4 days ago. He denies any alcohol use or being on any other medications. There are no other complaints, changes, or physical findings at this time. PCP: Armando Vieira MD    No current facility-administered medications on file prior to encounter. Current Outpatient Medications on File Prior to Encounter   Medication Sig Dispense Refill    meloxicam (MOBIC) 15 mg tablet Take 1 Tablet by mouth daily (after breakfast). Take x 1 week and then stop. May use daily after this as needed. 30 Tablet 0       Past History     Past Medical History:  History reviewed. No pertinent past medical history. Past Surgical History:  No past surgical history on file.     Family History:  Family History   Problem Relation Age of Onset   Jessica Mcfadden Pacemaker Mother     Cancer Mother         Breast    Dementia Father     Other Sister         Diverticulitis    Heart Disease Neg Hx        Social History:  Social History     Tobacco Use    Smoking status: Former Smoker     Packs/day: 1.00     Quit date: 2019     Years since quittin.4    Smokeless tobacco: Never Used   Vaping Use    Vaping Use: Never used   Substance Use Topics    Alcohol use: No    Drug use: No       Allergies:  No Known Allergies      Review of Systems   Review of Systems   Constitutional: Negative for fatigue and fever. HENT: Negative. Eyes: Negative. Respiratory: Negative for shortness of breath and wheezing. Cardiovascular: Negative for chest pain and leg swelling. Gastrointestinal: Positive for abdominal pain, diarrhea and nausea. Negative for blood in stool, constipation and vomiting. Endocrine: Negative. Genitourinary: Negative for difficulty urinating and dysuria. Musculoskeletal: Negative. Skin: Negative. Allergic/Immunologic: Negative. Neurological: Negative for weakness and numbness. Hematological: Negative. Psychiatric/Behavioral: Negative. Physical Exam   Physical Exam  Vitals and nursing note reviewed. Constitutional:       General: He is not in acute distress. Appearance: Normal appearance. He is well-developed. HENT:      Head: Normocephalic and atraumatic. Eyes:      Extraocular Movements: Extraocular movements intact. Conjunctiva/sclera: Conjunctivae normal.   Neck:      Vascular: No JVD. Trachea: No tracheal deviation. Cardiovascular:      Rate and Rhythm: Normal rate and regular rhythm. Heart sounds: No murmur heard. No friction rub. No gallop. Pulmonary:      Effort: Pulmonary effort is normal. No respiratory distress. Breath sounds: Normal breath sounds. No stridor. No wheezing. Abdominal:      General: Bowel sounds are normal. There is no distension. Palpations: Abdomen is soft. There is no mass. Tenderness: There is no abdominal tenderness. There is no guarding.    Musculoskeletal: General: No tenderness. Normal range of motion. Cervical back: Neck supple. Comments: No deformity   Skin:     General: Skin is warm and dry. Findings: No rash. Neurological:      Mental Status: He is alert and oriented to person, place, and time. Comments: No focal deficits   Psychiatric:         Behavior: Behavior normal.         Thought Content: Thought content normal.         Judgment: Judgment normal.         Diagnostic Study Results     Labs -  Recent Results (from the past 72 hour(s))   URINALYSIS W/ REFLEX CULTURE    Collection Time: 07/26/21  8:12 AM    Specimen: Miscellaneous sample; Urine    Urine specimen   Result Value Ref Range    Color YELLOW/STRAW      Appearance CLEAR CLEAR      Specific gravity 1.030 1.003 - 1.030      pH (UA) 5.5 5.0 - 8.0      Protein Negative NEG mg/dL    Glucose Negative NEG mg/dL    Ketone TRACE (A) NEG mg/dL    Blood Negative NEG      Urobilinogen 0.2 0.2 - 1.0 EU/dL    Nitrites Negative NEG      Leukocyte Esterase Negative NEG      UA:UC IF INDICATED CULTURE NOT INDICATED BY UA RESULT CNI      WBC 0-4 0 - 4 /hpf    RBC 0-5 0 - 5 /hpf    Epithelial cells FEW FEW /lpf    Bacteria Negative NEG /hpf    Hyaline cast 0-2 0 - 5 /lpf   BILIRUBIN, CONFIRM    Collection Time: 07/26/21  8:12 AM   Result Value Ref Range    Bilirubin UA, confirm Negative NEG     METABOLIC PANEL, COMPREHENSIVE    Collection Time: 07/26/21  8:42 AM   Result Value Ref Range    Sodium 138 136 - 145 mmol/L    Potassium 3.9 3.5 - 5.1 mmol/L    Chloride 107 97 - 108 mmol/L    CO2 30 21 - 32 mmol/L    Anion gap 1 (L) 5 - 15 mmol/L    Glucose 99 65 - 100 mg/dL    BUN 12 6 - 20 MG/DL    Creatinine 0.97 0.70 - 1.30 MG/DL    BUN/Creatinine ratio 12 12 - 20      GFR est AA >60 >60 ml/min/1.73m2    GFR est non-AA >60 >60 ml/min/1.73m2    Calcium 8.8 8.5 - 10.1 MG/DL    Bilirubin, total 0.4 0.2 - 1.0 MG/DL    ALT (SGPT) 30 12 - 78 U/L    AST (SGOT) 22 15 - 37 U/L    Alk.  phosphatase 51 45 - 117 U/L    Protein, total 7.9 6.4 - 8.2 g/dL    Albumin 3.7 3.5 - 5.0 g/dL    Globulin 4.2 (H) 2.0 - 4.0 g/dL    A-G Ratio 0.9 (L) 1.1 - 2.2     CBC WITH AUTOMATED DIFF    Collection Time: 07/26/21  8:42 AM   Result Value Ref Range    WBC 3.8 (L) 4.1 - 11.1 K/uL    RBC 4.52 4.10 - 5.70 M/uL    HGB 12.4 12.1 - 17.0 g/dL    HCT 39.5 36.6 - 50.3 %    MCV 87.4 80.0 - 99.0 FL    MCH 27.4 26.0 - 34.0 PG    MCHC 31.4 30.0 - 36.5 g/dL    RDW 13.2 11.5 - 14.5 %    PLATELET 255 598 - 822 K/uL    MPV 10.7 8.9 - 12.9 FL    NRBC 0.0 0  WBC    ABSOLUTE NRBC 0.00 0.00 - 0.01 K/uL    NEUTROPHILS 51 32 - 75 %    LYMPHOCYTES 33 12 - 49 %    MONOCYTES 13 5 - 13 %    EOSINOPHILS 3 0 - 7 %    BASOPHILS 0 0 - 1 %    IMMATURE GRANULOCYTES 0 0.0 - 0.5 %    ABS. NEUTROPHILS 1.9 1.8 - 8.0 K/UL    ABS. LYMPHOCYTES 1.2 0.8 - 3.5 K/UL    ABS. MONOCYTES 0.5 0.0 - 1.0 K/UL    ABS. EOSINOPHILS 0.1 0.0 - 0.4 K/UL    ABS. BASOPHILS 0.0 0.0 - 0.1 K/UL    ABS. IMM. GRANS. 0.0 0.00 - 0.04 K/UL    DF AUTOMATED     LIPASE    Collection Time: 07/26/21  8:42 AM   Result Value Ref Range    Lipase 128 73 - 393 U/L       Radiologic Studies -   No orders to display     CT Results  (Last 48 hours)    None        CXR Results  (Last 48 hours)    None          Medical Decision Making   I am the first provider for this patient. I reviewed the vital signs, available nursing notes, past medical history, past surgical history, family history and social history. Vital Signs-Reviewed the patient's vital signs. Patient Vitals for the past 12 hrs:   Temp Pulse Resp BP SpO2   07/26/21 0757 98.5 °F (36.9 °C) 79 16 (!) 151/72 99 %         Records Reviewed: Nursing Notes and Old Medical Records    Provider Notes (Medical Decision Making):   Patient is a 66-year-old male presenting with a 1 week history of nausea with defecation and eating. Patient has not had any symptoms of a GI bleed.   Abdominal exam shows discomfort but no acute tenderness and is otherwise nonacute. Will check labs. Patient symptoms are likely secondary to the meloxicam that he was on. Patient has already stopped his meloxicam.      ED Course:   Initial assessment performed. The patients presenting problems have been discussed, and they are in agreement with the care plan formulated and outlined with them. I have encouraged them to ask questions as they arise throughout their visit. Disposition:    DC- Adult Discharges: All of the diagnostic tests were reviewed and questions answered. Diagnosis, care plan and treatment options were discussed. The patient understands the instructions and will follow up as directed. The patients results have been reviewed with them. They have been counseled regarding their diagnosis. The patient verbally convey understanding and agreement of the signs, symptoms, diagnosis, treatment and prognosis and additionally agrees to follow up as recommended with their PCP in 24 - 48 hours. They also agree with the care-plan and convey that all of their questions have been answered. I have also put together some discharge instructions for them that include: 1) educational information regarding their diagnosis, 2) how to care for their diagnosis at home, as well a 3) list of reasons why they would want to return to the ED prior to their follow-up appointment, should their condition change. DISCHARGE PLAN:  1. Discharge Medication List as of 7/26/2021 10:19 AM      START taking these medications    Details   sucralfate (Carafate) 1 gram tablet Take 1 Tablet by mouth four (4) times daily. , Normal, Disp-30 Tablet, R-0      Omeprazole delayed release (PRILOSEC D/R) 20 mg tablet Take 1 Tablet by mouth daily. , Normal, Disp-30 Tablet, R-0         CONTINUE these medications which have NOT CHANGED    Details   meloxicam (MOBIC) 15 mg tablet Take 1 Tablet by mouth daily (after breakfast). Take x 1 week and then stop.   May use daily after this as needed., Normal, Disp-30 Tablet, R-0           2. Follow-up Information     Follow up With Specialties Details Why Contact Info    Lynne Abebe MD Family Medicine Schedule an appointment as soon as possible for a visit   [facility]  [phone]      [facility] EMERGENCY DEPT Emergency Medicine  As needed, If symptoms worsen [address]   [address] [address]  [phone]        3. Return to ED if worse     Diagnosis     Clinical Impression:   1. Nausea without vomiting        Attestations:    Polina Combs, DO    Please note that this dictation was completed with [employer], the computer voice recognition software. Quite often unanticipated grammatical, syntax, homophones, and other interpretive errors are inadvertently transcribed by the computer software. Please disregard these errors. Please excuse any errors that have escaped final proofreading. Thank you.

## 2021-07-26 NOTE — DISCHARGE INSTRUCTIONS
You are seen emergency department today for abdominal discomfort and nausea after eating and having bowel movements. Your lab work was normal and showed no signs for infection, anemia or any change with your liver or pancreatic enzymes. Your symptoms are likely secondary to the meloxicam you were on, which can cause stomach upset. We are sending you home with some medicine to help your stomach lining heal. Please call your PCP to make a follow up appointment. Return to the ER if you have ant worsening symptoms.

## 2021-07-27 NOTE — PROGRESS NOTES
Please call to set up follow-up appointment-can be a virtual.  X-rays of his back with no significant concerns. There are some mild arthritis changes around the hip but this should not be causing his symptoms. He also went to the ER recently so should follow-up.   Thanks

## 2021-08-05 ENCOUNTER — VIRTUAL VISIT (OUTPATIENT)
Dept: FAMILY MEDICINE CLINIC | Age: 46
End: 2021-08-05
Payer: COMMERCIAL

## 2021-08-05 DIAGNOSIS — K29.60 GASTRITIS DUE TO NONSTEROIDAL ANTI-INFLAMMATORY DRUG (NSAID): Primary | ICD-10-CM

## 2021-08-05 DIAGNOSIS — K59.01 SLOW TRANSIT CONSTIPATION: ICD-10-CM

## 2021-08-05 DIAGNOSIS — T39.395A GASTRITIS DUE TO NONSTEROIDAL ANTI-INFLAMMATORY DRUG (NSAID): Primary | ICD-10-CM

## 2021-08-05 PROCEDURE — 99213 OFFICE O/P EST LOW 20 MIN: CPT | Performed by: FAMILY MEDICINE

## 2021-08-05 NOTE — PROGRESS NOTES
Chief Complaint   Patient presents with    Follow-up     ER visit abdominal pain   1. Have you been to the ER, urgent care clinic since your last visit? Hospitalized since your last visit? Yes Where: ED [facility] ER    7/26/21    2. Have you seen or consulted any other health care providers outside of the [address] [place] [address] since your last visit? Include any pap smears or colon screening.  No     Having problem with constipation

## 2021-08-05 NOTE — PROGRESS NOTES
Rose Mary Johnson. (: 1975) is a 55 y.o. male, established patient, here for evaluation of the following chief complaint(s):   Follow-up (ER visit abdominal pain)       ASSESSMENT/PLAN: Discussed he likely had a gastritis from 1 week of Mobic. Encouraged him to finish out Prilosec and avoid NSAIDs at this point. Still with significant stressors with his wife undergoing chemo. Would expect his constipation symptoms to improve after finishing Prilosec in the next 4 weeks and can use MiraLAX as needed. We will see her back in 3 months and discuss having colonoscopy given his age. Below is the assessment and plan developed based on review of pertinent labs, studies, and medications. 1. Gastritis due to nonsteroidal anti-inflammatory drug (NSAID)  2. Slow transit constipation      Return in about 3 months (around 2021), or if symptoms worsen or fail to improve. SUBJECTIVE/OBJECTIVE:    Doing ok today. Having some constipation sx with some discomfort. BMs every other day now. Started after recent ER visit for abdominal pain with nausea and diarrhea. At last virtual appointment, treated with Mobic acutely for 1 week and patient stopped after this. He developed the abdominal pain and nausea afterwards and was given Carafate and Prilosec in the ER. He did not have any hematochezia, melena, hematemesis, or vomiting. He reports improvement and nausea and abdominal pain but is now dealing with constipation type symptoms. Feels like he is handling his stressors better. No further concerns with paresthesias. ROS PER HPI    No flowsheet data found.     Physical exam:  General appearance - alert, well appearing, and in no distress  Eyes -sclera anicteric, no discharge  HEENT normocephalic, atraumatic, moist mucous membranes, no visualized neck mass  Chest -normal respiratory effort, no visualized signs of respiratory distress  Neurological - alert, awake, normal speech, no focal findings or movement disorder noted  Psych - normal mood and affect  Skin no apparent lesions    On this date 08/05/2021 I have spent 20 minutes reviewing previous notes, test results and face to face (virtual) with the patient discussing the diagnosis and importance of compliance with the treatment plan as well as documenting on the day of the visit. Jeannine BerriosGill, was evaluated through a synchronous (real-time) audio-video encounter. The patient (or guardian if applicable) is aware that this is a billable service. Verbal consent to proceed has been obtained within the past 12 months. The visit was conducted pursuant to the emergency declaration under the [address] [place] [address], [address] [place] [address] authority and the [employer] General Act. Patient identification was verified, and a caregiver was present when appropriate. The patient was located in a state where the provider was credentialed to provide care. An electronic signature was used to authenticate this note.   -- Myriam Bennett MD

## 2021-10-31 ENCOUNTER — HOSPITAL ENCOUNTER (EMERGENCY)
Age: 46
Discharge: HOME | End: 2021-10-31
Attending: EMERGENCY MEDICINE
Payer: COMMERCIAL

## 2021-10-31 VITALS
WEIGHT: 168.87 LBS | HEIGHT: 69 IN | RESPIRATION RATE: 15 BRPM | DIASTOLIC BLOOD PRESSURE: 65 MMHG | TEMPERATURE: 98.8 F | OXYGEN SATURATION: 100 % | BODY MASS INDEX: 25.01 KG/M2 | SYSTOLIC BLOOD PRESSURE: 127 MMHG | HEART RATE: 65 BPM

## 2021-10-31 DIAGNOSIS — S61.011A LACERATION OF RIGHT THUMB WITHOUT FOREIGN BODY WITHOUT DAMAGE TO NAIL, INITIAL ENCOUNTER: Primary | ICD-10-CM

## 2021-10-31 DIAGNOSIS — Z23 NEED FOR PROPHYLACTIC VACCINATION AGAINST DIPHTHERIA AND TETANUS: ICD-10-CM

## 2021-10-31 PROCEDURE — 90715 TDAP VACCINE 7 YRS/> IM: CPT | Performed by: PHYSICIAN ASSISTANT

## 2021-10-31 PROCEDURE — 99281 EMR DPT VST MAYX REQ PHY/QHP: CPT

## 2021-10-31 PROCEDURE — 90471 IMMUNIZATION ADMIN: CPT

## 2021-10-31 PROCEDURE — 74011250636 HC RX REV CODE- 250/636: Performed by: PHYSICIAN ASSISTANT

## 2021-10-31 RX ADMIN — TETANUS TOXOID, REDUCED DIPHTHERIA TOXOID AND ACELLULAR PERTUSSIS VACCINE, ADSORBED 0.5 ML: 5; 2.5; 8; 8; 2.5 SUSPENSION INTRAMUSCULAR at 18:23

## 2021-10-31 NOTE — ED PROVIDER NOTES
EMERGENCY DEPARTMENT HISTORY AND PHYSICAL EXAM      Date: 10/31/2021  Patient Name: Duarte Blakely. History of Presenting Illness     Chief Complaint   Patient presents with    Laceration     Laceration to R thumb from a hot water heater, unsure of when his last tetanus shot was       History Provided By: Patient    HPI: Duarte Ellsworth, 55 y.o. male presents ambulatory to the ED with cc of an hour or so of mild but constant tenderness to the skin of the right thumb that is worse with palpation. He tells me he was reaching under his hot water heater to make adjustments to the temperature when he accidentally cut his right thumb on the sharp edge of the metal frame. He is uncertain of his tetanus status. He denies any other injuries. He is right-hand dominant. He has been well lately without fever. He denies any difficulty moving his thumb. He takes no oral anticoagulants. Patient has no known medication allergies and takes no medications daily. There are no other complaints, changes, or physical findings at this time. PCP: Megan Carter MD    Current Outpatient Medications   Medication Sig Dispense Refill    Omeprazole delayed release (PRILOSEC D/R) 20 mg tablet Take 1 Tablet by mouth daily. 30 Tablet 0     Past History     Past Medical History:  No past medical history on file. Past Surgical History:  No past surgical history on file.     Family History:  Family History   Problem Relation Age of Onset   Ashu Ramirez Pacemaker Mother    Ashuyarelis Ramirez Cancer Mother         Breast    Dementia Father     Other Sister         Diverticulitis    Heart Disease Neg Hx        Social History:  Social History     Tobacco Use    Smoking status: Former Smoker     Packs/day: 1.00     Quit date: 2019     Years since quittin.7    Smokeless tobacco: Never Used   Vaping Use    Vaping Use: Never used   Substance Use Topics    Alcohol use: No    Drug use: No       Allergies:  No Known Allergies  Review of Systems   Review of Systems   Constitutional: Negative for fatigue and fever. HENT: Negative for congestion, ear pain and rhinorrhea. Eyes: Negative for pain and redness. Respiratory: Negative for cough and wheezing. Cardiovascular: Negative for chest pain and palpitations. Gastrointestinal: Negative for abdominal pain, nausea and vomiting. Genitourinary: Negative for dysuria, frequency and urgency. Musculoskeletal: Negative for back pain, neck pain and neck stiffness. Skin: Positive for wound. Negative for rash. Neurological: Negative for weakness, light-headedness, numbness and headaches. Physical Exam   Physical Exam  Vitals and nursing note reviewed. Constitutional:       General: He is not in acute distress. Appearance: He is well-developed. He is not toxic-appearing. HENT:      Head: Normocephalic and atraumatic. No right periorbital erythema or left periorbital erythema. Jaw: No trismus. Right Ear: External ear normal.      Left Ear: External ear normal.      Nose: Nose normal.      Mouth/Throat:      Pharynx: Uvula midline. Eyes:      General: No scleral icterus. Conjunctiva/sclera: Conjunctivae normal.      Pupils: Pupils are equal, round, and reactive to light. Cardiovascular:      Rate and Rhythm: Normal rate and regular rhythm. Heart sounds: Normal heart sounds. Pulmonary:      Effort: Pulmonary effort is normal. No tachypnea, accessory muscle usage or respiratory distress. Breath sounds: Normal breath sounds. No decreased breath sounds or wheezing. Abdominal:      Palpations: Abdomen is soft. Abdomen is not rigid. Tenderness: There is no abdominal tenderness. There is no guarding. Musculoskeletal:         General: Normal range of motion. Right hand: Laceration present. Hands:       Cervical back: Full passive range of motion without pain and normal range of motion.       Comments:   RIGHT THUMB:  ~2cm curvilinear and superficial vertically oriented laceration to the skin of the right thumb. Patient has full active range of motion with full strength. Skin:     Findings: No rash. Neurological:      Mental Status: He is alert and oriented to person, place, and time. He is not disoriented. GCS: GCS eye subscore is 4. GCS verbal subscore is 5. GCS motor subscore is 6. Cranial Nerves: No cranial nerve deficit. Sensory: No sensory deficit. Psychiatric:         Speech: Speech normal.       Diagnostic Study Results     Labs -   No results found for this or any previous visit (from the past 12 hour(s)). Radiologic Studies -   No orders to display     CT Results  (Last 48 hours)    None        CXR Results  (Last 48 hours)    None        Medical Decision Making   I am the first provider for this patient. I reviewed the vital signs, available nursing notes, past medical history, past surgical history, family history and social history. Vital Signs-Reviewed the patient's vital signs. Patient Vitals for the past 12 hrs:   Temp Pulse Resp BP SpO2   10/31/21 1615 98.8 °F (37.1 °C) 65 15 127/65 100 %       Pulse Oximetry Analysis - 100% on RA    Records Reviewed: Nursing Notes, Old Medical Records, Previous Radiology Studies and Previous Laboratory Studies    Provider Notes (Medical Decision Making): Will update tetanus. Mechanism does not suggest the likelihood of fracture or foreign body: imaging deferred  Will permit to heal by secondary intention: sterile bulky pressure dressing applied after wound care. Wound care instructions. Return precautions. ED Course:   Initial assessment performed. The patients presenting problems have been discussed, and they are in agreement with the care plan formulated and outlined with them. I have encouraged them to ask questions as they arise throughout their visit. Disposition:  Discharge    PLAN:  1.    Discharge Medication List as of 10/31/2021  6:28 PM 2.   Follow-up Information     Follow up With Specialties Details Why Contact Info    Lakisha Roldan MD Family Medicine Call  As needed [facility] [address] [address]  [phone]          Return to ED if worse     Diagnosis     Clinical Impression:   1. Laceration of right thumb without foreign body without damage to nail, initial encounter    2.  Need for prophylactic vaccination against diphtheria and tetanus

## 2021-10-31 NOTE — LETTER
[address]  [facility] EMERGENCY DEPT  [address] [facility]  [place] [address]  [phone]    Work/School Note    Date: 10/31/2021    To Whom It May concern:    Evangelista Ramirez was seen and treated today in the emergency room by the following provider(s):  Attending Provider: Wilfrid Rose MD  Physician Assistant: CEZAR Null. Evangelista Ramirez may return to work on [id number].     Sincerely,          CEZAR Mc

## 2021-12-15 ENCOUNTER — OFFICE VISIT (OUTPATIENT)
Dept: FAMILY MEDICINE CLINIC | Age: 46
End: 2021-12-15
Payer: COMMERCIAL

## 2021-12-15 VITALS
TEMPERATURE: 97.7 F | RESPIRATION RATE: 16 BRPM | DIASTOLIC BLOOD PRESSURE: 55 MMHG | WEIGHT: 171 LBS | BODY MASS INDEX: 25.33 KG/M2 | SYSTOLIC BLOOD PRESSURE: 105 MMHG | HEIGHT: 69 IN | OXYGEN SATURATION: 100 % | HEART RATE: 67 BPM

## 2021-12-15 DIAGNOSIS — Z11.59 NEED FOR HEPATITIS C SCREENING TEST: ICD-10-CM

## 2021-12-15 DIAGNOSIS — R10.9 CHRONIC ABDOMINAL PAIN: ICD-10-CM

## 2021-12-15 DIAGNOSIS — E55.9 VITAMIN D DEFICIENCY: ICD-10-CM

## 2021-12-15 DIAGNOSIS — G89.29 CHRONIC ABDOMINAL PAIN: ICD-10-CM

## 2021-12-15 DIAGNOSIS — Z00.00 WELL ADULT EXAM: Primary | ICD-10-CM

## 2021-12-15 PROCEDURE — 99396 PREV VISIT EST AGE 40-64: CPT | Performed by: FAMILY MEDICINE

## 2021-12-15 RX ORDER — PHENOL/SODIUM PHENOLATE
20 AEROSOL, SPRAY (ML) MUCOUS MEMBRANE DAILY
Qty: 90 TABLET | Refills: 0 | Status: SHIPPED | OUTPATIENT
Start: 2021-12-15 | End: 2022-03-09

## 2021-12-15 NOTE — PROGRESS NOTES
Subjective:     Chief Complaint   Patient presents with    Complete Physical     Annual        He  is a 55 y.o. male who presents for evaluation of:    Burning sensation in abd (all quadrants). Sx x 1 month. Seems to come and go. No urinary sx. No hematochezia. Worst pain in epigastric area and central suprapubic area. Mylanta sooths pain. Worse with pressing on abd. Does seem to be worse after intercourse. Stress doing ok. Not exercising. ROS:  Constitutional: negative for fevers, chills and fatigue  Eyes: negative for visual disturbance  Ears, nose, mouth, throat, and face: negative for hearing loss and sore throat  Respiratory: negative for cough or dyspnea on exertion  Cardiovascular: negative for chest pain, dyspnea, palpitations, fatigue  Gastrointestinal: per hpi  Genitourinary:negative for frequency and dysuria  Integument/breast: negative for rash and skin lesion(s)  Hematologic/lymphatic: negative for easy bruising and bleeding  Musculoskeletal:negative for myalgias and muscle weakness  Neurological: negative for headaches and dizziness  Behavioral/Psych: Per HPI    History reviewed. No pertinent past medical history. History reviewed. No pertinent surgical history. Current Outpatient Medications on File Prior to Visit   Medication Sig Dispense Refill    [DISCONTINUED] Omeprazole delayed release (PRILOSEC D/R) 20 mg tablet Take 1 Tablet by mouth daily. (Patient not taking: Reported on 12/15/2021) 30 Tablet 0     No current facility-administered medications on file prior to visit.         Objective:     Vitals:    12/15/21 1132   BP: (!) 105/55   Pulse: 67   Resp: 16   Temp: 97.7 °F (36.5 °C)   TempSrc: Temporal   SpO2: 100%   Weight: 171 lb (77.6 kg)   Height: 5' 9\" (1.753 m)     Physical Examination:  General appearance - alert, well appearing, and in no distress  Eyes - sclera anicteric  Neck - supple, no significant adenopathy  Lymphatics - no palpable lymphadenopathy, no hepatosplenomegaly  Chest - clear to auscultation, no wheezes, rales or rhonchi, symmetric air entry  Heart - normal rate, regular rhythm, normal S1, S2, no murmurs, rubs, clicks or gallops  Abdomen - soft, nontender, nondistended, no masses or organomegaly   - not indicated  Neurological - alert, oriented, normal speech, no focal findings or movement disorder noted  Musculoskeletal - no joint tenderness, deformity or swelling  Extremities - no edema  Skin - no rashes or suspicious lesions  Psych - nml mood and affect    Assessment/ Plan:   Diagnoses and all orders for this visit:    1. Well adult exam  -     HEMOGLOBIN A1C WITH EAG; Future  -     LIPID PANEL; Future  -     METABOLIC PANEL, COMPREHENSIVE; Future  -     TSH 3RD GENERATION; Future  -     CBC W/O DIFF; Future  -     URINALYSIS W/ RFLX MICROSCOPIC; Future    2. Chronic abdominal pain - trial of PPI. Checking labs and getting US given duration. To GI if not improving.  -     Omeprazole delayed release (PRILOSEC D/R) 20 mg tablet; Take 1 Tablet by mouth daily.  -     H PYLORI AG, STOOL; Future  -     METABOLIC PANEL, COMPREHENSIVE; Future  -     CBC W/O DIFF; Future  -     URINALYSIS W/ RFLX MICROSCOPIC; Future  -     LIPASE; Future  -     US ABD COMP; Future  -     REFERRAL TO GASTROENTEROLOGY    3. Need for hepatitis C screening test  -     HEPATITIS C AB; Future    4. Vitamin D deficiency  -     VITAMIN D, 25 HYDROXY; Future    I have discussed the diagnosis with the patient and the intended plan as seen in the above orders. The patient has received an after-visit summary and questions were answered concerning future plans. I have discussed medication side effects and warnings with the patient as well. The patient verbalizes understanding and agreement with the plan. Follow-up and Dispositions    · Return in about 6 weeks (around 1/26/2022), or if symptoms worsen or fail to improve.

## 2021-12-15 NOTE — PROGRESS NOTES
Chief Complaint   Patient presents with    Complete Physical     Annual   1. Have you been to the ER, urgent care clinic since your last visit? Hospitalized since your last visit? Yes [facility] Cut finger      2. Have you seen or consulted any other health care providers outside of the [address] [place] [address] since your last visit? Include any pap smears or colon screening.  No

## 2021-12-17 ENCOUNTER — HOSPITAL ENCOUNTER (OUTPATIENT)
Dept: ULTRASOUND IMAGING | Age: 46
Discharge: HOME | End: 2021-12-17
Attending: FAMILY MEDICINE
Payer: COMMERCIAL

## 2021-12-17 PROCEDURE — 76700 US EXAM ABDOM COMPLETE: CPT

## 2021-12-19 DIAGNOSIS — E05.90 HYPERTHYROIDISM: Primary | ICD-10-CM

## 2022-01-26 ENCOUNTER — OFFICE VISIT (OUTPATIENT)
Dept: FAMILY MEDICINE CLINIC | Age: 47
End: 2022-01-26
Payer: COMMERCIAL

## 2022-01-26 VITALS
SYSTOLIC BLOOD PRESSURE: 120 MMHG | HEART RATE: 65 BPM | RESPIRATION RATE: 16 BRPM | DIASTOLIC BLOOD PRESSURE: 67 MMHG | TEMPERATURE: 97.7 F | HEIGHT: 69 IN | BODY MASS INDEX: 26.69 KG/M2 | WEIGHT: 180.2 LBS | OXYGEN SATURATION: 97 %

## 2022-01-26 DIAGNOSIS — E05.90 SUBCLINICAL HYPERTHYROIDISM: Primary | ICD-10-CM

## 2022-01-26 DIAGNOSIS — R76.8 ANTI-TPO ANTIBODIES PRESENT: ICD-10-CM

## 2022-01-26 PROCEDURE — 99214 OFFICE O/P EST MOD 30 MIN: CPT | Performed by: FAMILY MEDICINE

## 2022-01-26 NOTE — PROGRESS NOTES
Nicole Vang. (: 1975) is a 55 y.o. male, established patient, here for evaluation of the following chief complaint(s):  Follow-up (4 week)       ASSESSMENT/PLAN:  No sig sx and noted incidentally with TSH < 0.01 (screening with family hx). Additional labs with improving TSH and sig abnormal TPO ab. Checking TRAb to r/o Graves. Sending to Endo and may plan for uptake scan. Pt is technically in the lower risk category. Below is the assessment and plan developed based on review of pertinent history, physical exam, labs, studies, and medications. 1. Subclinical hyperthyroidism  -     REFERRAL TO ENDOCRINOLOGY  -     US THYROID/PARATHYROID/SOFT TISS; Future  -     TSH 3RD GENERATION; Future  -     T4, FREE; Future  -     T3 TOTAL; Future  -     TSH RECEPTOR AB; Future  2. Anti-TPO antibodies present  -     REFERRAL TO ENDOCRINOLOGY  -     US THYROID/PARATHYROID/SOFT TISS; Future  -     TSH 3RD GENERATION; Future  -     T4, FREE; Future  -     T3 TOTAL; Future  -     TSH RECEPTOR AB; Future      Return in about 6 weeks (around 3/9/2022), or if symptoms worsen or fail to improve. SUBJECTIVE/OBJECTIVE:    Subclinical Hyperthyroidism  Patient presents with hyperthyroidism. Denies changes in vision, headache, tachycardia, heat intolerance, irritability, jitteriness, restlessness, poor sleeping, poor eating, weight loss, poor weight gain, palpitations, goiter, diarrhea, increased stool frequency, increased urinary frequency, exophthalmos, eye pain, neck pain, emotional lability, photophobia, hair loss, behavior problems. The patient denies drug abuse, amphetamine use, diet pills, biotin, episodic hypertension, use of thyroid medicines, URI symptoms, tender neck / sore throat, pheochromocytoma. Prior studies include TSH, FT4, Total T3. Family history includes hyperthyroidism - in dad ( from dementia)    At last check, labs showed:  Normal FT4 and Total T3.     Lab Results   Component Value Date/Time    TSH 0.02 (L) 01/11/2022 09:25 AM    TSH <0.01 (L) 12/17/2021 09:02 AM    TSH 6.700 (H) 11/18/2020 10:40 AM    TSH 4.590 (H) 03/11/2019 02:10 PM    TSH 4.78 (H) 02/05/2019 09:17 AM     ROS:  Constitutional: negative for fevers, chills and fatigue  Eyes: negative for visual disturbance, no change in eyes  Ears, nose, mouth, throat, and face: negative for hearing loss and sore throat  Respiratory: negative for cough or dyspnea on exertion  Cardiovascular: negative for chest pain, dyspnea, palpitations, fatigue  Gastrointestinal: negative for nausea, vomiting, change in bowel habits, diarrhea and abdominal pain  Genitourinary:negative for frequency and dysuria  Integument/breast: negative for rash and skin lesion(s)  Hematologic/lymphatic: negative for easy bruising and bleeding  Musculoskeletal:negative for myalgias and muscle weakness  Neurological: negative for headaches and dizziness  Behavioral/Psych: negative for anxiety and depression    Blood pressure 120/67, pulse 65, temperature 97.7 °F (36.5 °C), temperature source Temporal, resp. rate 16, height 5' 9\" (1.753 m), weight 180 lb 3.2 oz (81.7 kg), SpO2 97 %.     Physical Exam  General appearance - alert, well appearing, and in no distress  Eyes -sclera anicteric, no exophthalmos, no lid lag/stare, nml VFs  Neck - supple, no significant adenopathy, no significant thyromegaly/goiter/nodules palpated  Chest - clear to auscultation, no wheezes, rales or rhonchi, symmetric air entry  Heart - normal rate, regular rhythm, normal S1, S2, no murmurs, rubs, clicks or gallops  Neurological - alert, oriented, normal speech, no focal findings or movement disorder noted, no tremors  Extr - no edema  Psych - normal mood and affect    On this date 01/26/2022 I have spent 30 minutes reviewing previous notes, test results and face to face with the patient discussing the diagnosis and importance of compliance with the treatment plan as well as documenting on the day of the visit. An electronic signature was used to authenticate this note.   -- Eulogio More MD

## 2022-01-26 NOTE — PATIENT INSTRUCTIONS
Hyperthyroidism: Care Instructions  Your Care Instructions  Hyperthyroidism occurs when the thyroid gland makes too much thyroid hormone. This speeds up your metabolismhow your body uses energy. This condition can cause you to be very active, lose weight, and have sleep problems, eye problems, and a fast heart rate. It can also cause a goiter. A goiter is an enlarged thyroid gland that you can see at the front of the neck. Hyperthyroidism is often caused by Graves' disease. In Graves' disease, the body's defense (immune) system attacks the thyroid gland. Your doctor may prescribe a beta-blocker medicine to slow your pulse and calm you down. But this is not a treatment for hyperthyroidism. It is given for your fast heart rate. Your doctor may also give you antithyroid medicine. This medicine keeps excess thyroid hormone in check. In some cases, doctors recommend radioactive iodine or surgery to remove the thyroid. After either of these treatments, you may need to take medicine to replace thyroid hormone for the rest of your life. Follow-up care is a key part of your treatment and safety. Be sure to make and go to all appointments, and call your doctor if you are having problems. It's also a good idea to know your test results and keep a list of the medicines you take. How can you care for yourself at home? · Take your medicines exactly as prescribed. You need to take the thyroid medicine at the same time each day. Call your doctor if you think you are having a problem with your medicine. · Graves' disease can make your eyes sore. Use artificial tears, eye drops, and sunglasses to protect your eyes from dryness, wind, and sun. Raise your head with pillows at night to prevent your eyes from swelling. In some cases, taping your eyelids shut at night will keep your eyes from being dry in the morning. · Make sure you get enough calcium.  Foods that are rich in calcium include milk, yogurt, cheese, and dark green vegetables. · If you need to gain weight, ask your doctor about special diets. · Do not eat kelp. Aura Fridge is high in iodine, which can make hyperthyroidism worse. Aura Fridge is commonly used in [employer] and other [place] foods. You can use iodized salt and eat bread and seafood. Try to eat a balanced diet. · Do not use caffeine and other stimulants. These can make symptoms worse, such as a fast heartbeat, nervousness, and problems focusing. · Do not smoke. Smoking can make your condition worse and may lead to more serious eye problems. If you need help quitting, talk to your doctor about stop-smoking programs and medicines. These can increase your chances of quitting for good. · Lower your stress. Learn to use biofeedback, guided imagery, meditation, or other methods to relax. · Use creams or ointments for irritated skin. Ask your doctor which type to use. · Tell all your doctors about your condition. They need to know because some medicines contain iodine. When should you call for help? Call your doctor now or seek immediate medical care if:    · You have symptoms of a sudden, very high thyroid level (thyroid storm). These include:  ? Being nauseated, vomiting, and having diarrhea. ? Sweating a lot. ? Feeling extremely restless and confused. ? Having a high fever. ? Having a fast heartbeat.     · You have sudden vision changes or eye pain.     · You have a fever or severe sore throat and are taking antithyroid medicines, such as PTU or methimazole. Watch closely for changes in your health, and be sure to contact your doctor if:    · You have a sore throat or have problems swallowing.     · You have swollen, itchy, or red eyes or your other eye symptoms get worse, or you have new vision problems.     · You have signs of a low thyroid level (hypothyroidism). You may feel very tired, confused, or weak. Where can you learn more?   Go to http://www.gray.com/  Enter L505 in the search box to learn more about \"Hyperthyroidism: Care Instructions. \"  Current as of: December 2, 2020               Content Version: 13.0  © 6012-0068 [employer]. Care instructions adapted under license by [employer] (which disclaims liability or warranty for this information). If you have questions about a medical condition or this instruction, always ask your healthcare professional. [place] [address] any warranty or liability for your use of this information.

## 2022-01-31 ENCOUNTER — HOSPITAL ENCOUNTER (OUTPATIENT)
Dept: ULTRASOUND IMAGING | Age: 47
Discharge: HOME | End: 2022-01-31
Attending: FAMILY MEDICINE
Payer: COMMERCIAL

## 2022-01-31 DIAGNOSIS — R76.8 ANTI-TPO ANTIBODIES PRESENT: ICD-10-CM

## 2022-01-31 DIAGNOSIS — E05.90 SUBCLINICAL HYPERTHYROIDISM: ICD-10-CM

## 2022-01-31 PROCEDURE — 76536 US EXAM OF HEAD AND NECK: CPT

## 2022-02-01 DIAGNOSIS — E05.90 HYPERTHYROIDISM: Primary | ICD-10-CM

## 2022-02-01 DIAGNOSIS — E04.9 ENLARGED THYROID GLAND: ICD-10-CM

## 2022-02-01 DIAGNOSIS — R76.8 ANTI-TPO ANTIBODIES PRESENT: ICD-10-CM

## 2022-02-01 DIAGNOSIS — E04.1 THYROID NODULE: ICD-10-CM

## 2022-02-02 ENCOUNTER — TRANSCRIBE ORDER (OUTPATIENT)
Dept: SCHEDULING | Age: 47
End: 2022-02-02

## 2022-02-09 ENCOUNTER — HOSPITAL ENCOUNTER (OUTPATIENT)
Dept: NUCLEAR MEDICINE | Age: 47
Discharge: HOME | End: 2022-02-09
Attending: FAMILY MEDICINE
Payer: COMMERCIAL

## 2022-02-09 DIAGNOSIS — R76.8 ANTI-TPO ANTIBODIES PRESENT: ICD-10-CM

## 2022-02-09 DIAGNOSIS — E04.9 ENLARGED THYROID GLAND: ICD-10-CM

## 2022-02-09 DIAGNOSIS — E04.1 THYROID NODULE: ICD-10-CM

## 2022-02-09 DIAGNOSIS — E05.90 HYPERTHYROIDISM: ICD-10-CM

## 2022-02-09 PROCEDURE — 78012 THYROID UPTAKE MEASUREMENT: CPT

## 2022-02-09 RX ORDER — SODIUM IODIDE I 123 200 UCI/1
400 CAPSULE, GELATIN COATED ORAL ONCE
Status: COMPLETED | OUTPATIENT
Start: 2022-02-09 | End: 2022-02-09

## 2022-02-09 RX ADMIN — SODIUM IODIDE I 123 310 MICRO CURIE: 200 CAPSULE, GELATIN COATED ORAL at 08:50

## 2022-02-10 ENCOUNTER — HOSPITAL ENCOUNTER (OUTPATIENT)
Dept: NUCLEAR MEDICINE | Age: 47
Discharge: HOME | End: 2022-02-10
Attending: FAMILY MEDICINE
Payer: COMMERCIAL

## 2022-03-09 ENCOUNTER — OFFICE VISIT (OUTPATIENT)
Dept: FAMILY MEDICINE CLINIC | Age: 47
End: 2022-03-09
Payer: COMMERCIAL

## 2022-03-09 VITALS
DIASTOLIC BLOOD PRESSURE: 57 MMHG | HEART RATE: 68 BPM | BODY MASS INDEX: 27.28 KG/M2 | OXYGEN SATURATION: 100 % | HEIGHT: 69 IN | SYSTOLIC BLOOD PRESSURE: 97 MMHG | TEMPERATURE: 98.6 F | RESPIRATION RATE: 16 BRPM | WEIGHT: 184.2 LBS

## 2022-03-09 DIAGNOSIS — R76.8 ANTI-TPO ANTIBODIES PRESENT: ICD-10-CM

## 2022-03-09 DIAGNOSIS — R10.9 CHRONIC ABDOMINAL PAIN: ICD-10-CM

## 2022-03-09 DIAGNOSIS — E05.90 SUBCLINICAL HYPERTHYROIDISM: Primary | ICD-10-CM

## 2022-03-09 DIAGNOSIS — G89.29 CHRONIC ABDOMINAL PAIN: ICD-10-CM

## 2022-03-09 PROCEDURE — 99213 OFFICE O/P EST LOW 20 MIN: CPT | Performed by: FAMILY MEDICINE

## 2022-03-09 RX ORDER — PHENOL/SODIUM PHENOLATE
AEROSOL, SPRAY (ML) MUCOUS MEMBRANE
Qty: 90 TABLET | Refills: 0 | Status: SHIPPED | OUTPATIENT
Start: 2022-03-09 | End: 2022-03-23 | Stop reason: SDUPTHER

## 2022-03-09 NOTE — PROGRESS NOTES
Chief Complaint   Patient presents with    Follow-up     Labs   1. Have you been to the ER, urgent care clinic since your last visit? Hospitalized since your last visit? No    2. Have you seen or consulted any other health care providers outside of the [address] [place] [address] since your last visit? Include any pap smears or colon screening.  No

## 2022-03-09 NOTE — PROGRESS NOTES
Leland Cowden. (: 1975) is a 55 y.o. male, established patient, here for evaluation of the following chief complaint(s):  Follow-up (Labs)       ASSESSMENT/PLAN:     Below is the assessment and plan developed based on review of pertinent history, physical exam, labs, studies, and medications. 1. Subclinical hyperthyroidism  2. Anti-TPO antibodies present  3. Chronic abdominal pain    -Doing well with no sx. Suspect thyroiditis that has resolved but no sx even with abn numbers. Initially noted TSH < 0.01 (screening with family hx), sig abnormal TPO ab. TRAb neg to r/o Graves. US with  TSH has now returned to normal.   To see to Endo. Return in about 3 months (around 2022), or if symptoms worsen or fail to improve. SUBJECTIVE/OBJECTIVE:    Subclinical Hyperthyroidism  Patient presents with hyperthyroidism by labs. Denies changes in vision, headache, tachycardia, heat intolerance, irritability, jitteriness, restlessness, poor sleeping, poor eating, weight loss, poor weight gain, palpitations, goiter, diarrhea, increased stool frequency, increased urinary frequency, exophthalmos, eye pain, neck pain, emotional lability, photophobia, hair loss, behavior problems. The patient denies drug abuse, amphetamine use, diet pills, biotin, episodic hypertension, use of thyroid medicines, URI symptoms, tender neck / sore throat, pheochromocytoma. Prior studies include TSH, FT4, Total T3. Family history includes hyperthyroidism - in dad ( from dementia)    At last check, labs showed:  Normal FT4 and Total T3. Lab Results   Component Value Date/Time    TSH 2.41 2022 08:50 AM    TSH 0.02 (L) 2022 09:25 AM    TSH <0.01 (L) 2021 09:02 AM    TSH 6.700 (H) 2020 10:40 AM    TSH 4.590 (H) 2019 02:10 PM    TSH 4.78 (H) 2019 09:17 AM       US Thyroid 22  FINDINGS:  The thyroid gland is enlarged, diffusely heterogeneous, hypervascular.  There are 2 enlarged right cervical lymph nodes, one measures 2.6 cm and the second measures 2.1 cm. There are other prominent bilateral nodes.     The right lobe measures 6.1 x 2.9 x 2.7 cm and the left lobe measures 5.6 x 2.5 x 2.2 cm. The isthmus measures 0.84 cm.     IMPRESSION   1. Enlarged heterogeneous hypervascular thyroid compatible hyperthyroidism. 2. Enlarged bilateral cervical lymph nodes. Correlation and follow-up recommended. Nu uptake 2/10/22:  \"FINDINGS: Uptake is 26% (normal: 10% - 30%). Scintigraphic planar images of the thyroid gland are obtained in 3 projections. These demonstrate uniform radiotracer distribution within the gland, with no hot or cold nodules.     IMPRESSION  Normal Nuclear Thyroid Scan and Uptake. \"      ROS  Gen - no fever/chills  Resp - no dyspnea or cough  CV - no chest pain or ONEAL  GI - Occ abd pain improved. To see GI soon. Had [address] [facility] in 12/21 with no concerns. Took PPI but stopped   Rest per HPI    Blood pressure (!) 97/57, pulse 68, temperature 98.6 °F (37 °C), temperature source Temporal, resp. rate 16, height 5' 9\" (1.753 m), weight 184 lb 3.2 oz (83.6 kg), SpO2 100 %. Physical Exam  General appearance - alert, well appearing, and in no distress  Eyes -sclera anicteric  Neck - supple, no significant adenopathy, no thyromegaly  Chest - clear to auscultation, no wheezes, rales or rhonchi, symmetric air entry  Heart - normal rate, regular rhythm, normal S1, S2, no murmurs, rubs, clicks or gallops  Abd - soft, NT, ND  Neurological - alert, oriented, normal speech, no focal findings or movement disorder noted  Extr - no edema  Psych - normal mood and affect    On this date 03/09/2022 I have spent 20 minutes reviewing previous notes, test results and face to face with the patient discussing the diagnosis and importance of compliance with the treatment plan as well as documenting on the day of the visit. An electronic signature was used to authenticate this note.   -- Fredy Li MD

## 2022-03-18 PROBLEM — R00.2 PALPITATION: Status: ACTIVE | Noted: 2019-02-08

## 2022-03-23 DIAGNOSIS — R10.9 CHRONIC ABDOMINAL PAIN: ICD-10-CM

## 2022-03-23 DIAGNOSIS — G89.29 CHRONIC ABDOMINAL PAIN: ICD-10-CM

## 2022-03-25 ENCOUNTER — TRANSCRIBE ORDER (OUTPATIENT)
Dept: SCHEDULING | Age: 47
End: 2022-03-25

## 2022-03-25 DIAGNOSIS — R10.32 LLQ ABDOMINAL PAIN: Primary | ICD-10-CM

## 2022-03-25 DIAGNOSIS — Z83.71 FAMILY HISTORY OF POLYPS IN THE COLON: ICD-10-CM

## 2022-03-29 RX ORDER — PHENOL/SODIUM PHENOLATE
20 AEROSOL, SPRAY (ML) MUCOUS MEMBRANE DAILY
Qty: 90 TABLET | Refills: 0 | Status: SHIPPED | OUTPATIENT
Start: 2022-03-29 | End: 2022-04-27 | Stop reason: ALTCHOICE

## 2022-04-21 ENCOUNTER — HOSPITAL ENCOUNTER (OUTPATIENT)
Dept: CT IMAGING | Age: 47
Discharge: HOME | End: 2022-04-21
Attending: INTERNAL MEDICINE
Payer: COMMERCIAL

## 2022-04-21 DIAGNOSIS — Z83.71 FAMILY HISTORY OF POLYPS IN THE COLON: ICD-10-CM

## 2022-04-21 DIAGNOSIS — R10.32 LLQ ABDOMINAL PAIN: ICD-10-CM

## 2022-04-21 PROCEDURE — 74177 CT ABD & PELVIS W/CONTRAST: CPT

## 2022-04-21 PROCEDURE — 74011000636 HC RX REV CODE- 636: Performed by: INTERNAL MEDICINE

## 2022-04-21 PROCEDURE — 74011000250 HC RX REV CODE- 250: Performed by: INTERNAL MEDICINE

## 2022-04-21 RX ORDER — BARIUM SULFATE 20 MG/ML
900 SUSPENSION ORAL
Status: COMPLETED | OUTPATIENT
Start: 2022-04-21 | End: 2022-04-21

## 2022-04-21 RX ADMIN — BARIUM SULFATE 900 ML: 21 SUSPENSION ORAL at 07:40

## 2022-04-21 RX ADMIN — IOPAMIDOL 100 ML: 755 INJECTION, SOLUTION INTRAVENOUS at 07:39

## 2022-04-27 ENCOUNTER — OFFICE VISIT (OUTPATIENT)
Dept: ENDOCRINOLOGY | Age: 47
End: 2022-04-27
Payer: COMMERCIAL

## 2022-04-27 VITALS
SYSTOLIC BLOOD PRESSURE: 113 MMHG | BODY MASS INDEX: 27.55 KG/M2 | DIASTOLIC BLOOD PRESSURE: 65 MMHG | RESPIRATION RATE: 16 BRPM | HEIGHT: 69 IN | OXYGEN SATURATION: 100 % | HEART RATE: 61 BPM | WEIGHT: 186 LBS

## 2022-04-27 DIAGNOSIS — E06.3 HASHIMOTO'S THYROIDITIS: Primary | ICD-10-CM

## 2022-04-27 PROCEDURE — 99204 OFFICE O/P NEW MOD 45 MIN: CPT | Performed by: INTERNAL MEDICINE

## 2022-04-27 NOTE — PROGRESS NOTES
Chief Complaint   Patient presents with    New Patient    Thyroid Problem     Pr stated that he had abnormal thyroid labs and his PCP reuqested for him to be seen. History of Present Illness: Demetria New. is a 55 y.o. male with minimal past medical hx seen in referral from Rossi Mendez MD for discussion related to abnormal thyroid function tests. Prince araujo reports that thyroid function tests were obtained due to abdominal pain. Denies constipation, fatigue, weight gain, skin changes. Does note that his father took a medication for thyroid dysfunction, is not clear which medication it was. Last CT scan with contrast was 04/21/2022. Did not follow low iodine diet prior to I-123 uptake and scan. History reviewed. No pertinent past medical history. No past surgical history on file. Current Outpatient Medications   Medication Sig    Omeprazole delayed release (PRILOSEC D/R) 20 mg tablet Take 1 Tablet by mouth daily. (Patient not taking: Reported on 4/27/2022)     No current facility-administered medications for this visit.      No Known Allergies  Family History   Problem Relation Age of Onset   Kaminski Pacemaker Mother    Kaminski Cancer Mother         Breast    Diabetes Mother     Dementia Father     Thyroid Disease Father     Hypertension Father     Other Sister         Diverticulitis    Heart Disease Neg Hx        Social Hx:   Social History     Socioeconomic History    Marital status:    Tobacco Use    Smoking status: Former Smoker     Packs/day: 1.00     Quit date: 2/5/2019     Years since quitting: 3.2    Smokeless tobacco: Never Used   Vaping Use    Vaping Use: Never used   Substance and Sexual Activity    Alcohol use: No    Drug use: No    Sexual activity: Yes     Partners: Female     Birth control/protection: None         Review of Systems:  - See HPI    - Physical Examination:  Visit Vitals  /65   Pulse 61   Resp 16   Ht 5' 9\" (1.753 m)   Wt 186 lb (84.4 kg)   SpO2 100%   BMI 27.47 kg/m²   -   -   - - GENERAL: NCAT, Appears well nourished   - EYES: EOMI, non-icteric, no proptosis   - Ear/Nose/Throat: Slight thyromegaly is present  - CARDIOVASCULAR: no cyanosis, no visible JVD   - RESPIRATORY: respiratory effort normal without any distress or labored breathing   - MUSCULOSKELETAL: Normal ROM of neck and upper extremities observed   - SKIN: No rash on face  - NEUROLOGIC:  No facial asymmetry (Cranial nerve 7 motor function), No gaze palsy   - PSYCHIATRIC: Normal affect, Normal insight and judgement     Data Reviewed:                 Assessment/Plan: This is a very pleasant 30-year-old gentleman seen in referral from Shubham Barkley MD for discussion related to Hashimoto's thyroiditis. We reviewed the natural history of Hashimoto's thyroiditis and the fact that eventually he will require thyroid hormone. It is impossible to predict when his thyroid will \"burnout\" but ultrasound findings are consistent with an enlarged heterogeneous hypervascular thyroid that is \"on its last leg\". We will plan to repeat thyroid-stimulating immunoglobulin levels to definitively rule out antibody negative Graves' disease although this would be very surprising given the normal I-123 uptake and scan albeit this was not done following a week of a low iodine diet which could potentially lead to a false negative.       1. Hashimoto's thyroiditis    -Repeat thyroid function tests in 1 month  -Patient is currently biochemically euthyroid  -Although TSH was low on 2 occasions, have very low suspicion for antibody negative Graves' disease-we will obtain TSI now  -Reviewed the natural history of Hashimoto's thyroiditis    Copy sent Claudene Cumming, MD    4 months    [address], [place] [id number] [facility]

## 2022-06-02 LAB
T3 SERPL-MCNC: 127 NG/DL (ref 71–180)
T4 FREE SERPL-MCNC: 1.1 NG/DL (ref 0.82–1.77)
TSH SERPL DL<=0.005 MIU/L-ACNC: 4.17 UIU/ML (ref 0.45–4.5)
TSI SER-ACNC: 0.99 IU/L (ref 0–0.55)

## 2022-06-06 ENCOUNTER — OFFICE VISIT (OUTPATIENT)
Dept: ENDOCRINOLOGY | Age: 47
End: 2022-06-06
Payer: COMMERCIAL

## 2022-06-06 VITALS
SYSTOLIC BLOOD PRESSURE: 109 MMHG | WEIGHT: 186.2 LBS | DIASTOLIC BLOOD PRESSURE: 65 MMHG | OXYGEN SATURATION: 100 % | HEART RATE: 69 BPM | RESPIRATION RATE: 16 BRPM | BODY MASS INDEX: 27.58 KG/M2 | HEIGHT: 69 IN

## 2022-06-06 DIAGNOSIS — E05.00 GRAVES DISEASE: ICD-10-CM

## 2022-06-06 DIAGNOSIS — E06.3 HASHIMOTO'S THYROIDITIS: Primary | ICD-10-CM

## 2022-06-06 PROCEDURE — 99214 OFFICE O/P EST MOD 30 MIN: CPT | Performed by: INTERNAL MEDICINE

## 2022-06-06 NOTE — PROGRESS NOTES
Chief Complaint   Patient presents with    Thyroid Problem    Labs     History of Present Illness: Dominick Hanley is a 55 y.o. male with minimal past medical hx seen in follow up for discussion related to Grave's disease with positive TPO Ab. Initial visit:  Arnaud Fry reports that thyroid function tests were obtained due to abdominal pain. Denies constipation, fatigue, weight gain, skin changes. Does note that his father took a medication for thyroid dysfunction, is not clear which medication it was. Last CT scan with contrast was 04/21/2022. Did not follow low iodine diet prior to I-123 uptake and scan.    06/06/2022: Overall feeling well. Does recall a period in time where he experienced dysphagia that resolved spontaneously, reports he was told it was anxiety. Experiencing some pain in the L elbow. Prefers to monitor the thyroid rather that ablate or surgically excise it. Denies a gritty sensation in the eyes. Social Hx:   , former smoker    Review of Systems:  - See HPI    - Physical Examination:  Visit Vitals  /65   Pulse 69   Resp 16   Ht 5' 9\" (1.753 m)   Wt 186 lb 3.2 oz (84.5 kg)   SpO2 100%   BMI 27.50 kg/m²     -   - - GENERAL: NCAT, Appears EUthyroid  - EYES: EOMI, non-icteric, no proptosis   - Ear/Nose/Throat: Slight thyromegaly is present  - CARDIOVASCULAR: no cyanosis, no visible JVD   - RESPIRATORY: respiratory effort normal without any distress or labored breathing   - MUSCULOSKELETAL: Normal ROM of neck and upper extremities observed   - SKIN: No rash on face  - NEUROLOGIC:  No facial asymmetry (Cranial nerve 7 motor function), No gaze palsy   - PSYCHIATRIC: Normal affect, Normal insight and judgement     Data Reviewed:                     Assessment/Plan: This is a very pleasant 49-year-old gentleman seen in follow up for discussion related to Grave's disease with positive TPO Ab levels.   Somewhat suprisingly, TSI levels were positive in the setting of previously high normal TRAB levels. Overall, suggestive of currently relatively midle Grave's disease when also taking into consideration the borderline high normal I123 uptake and scan. Discussed symptoms suggestive of clinical Grave's disease which may occur at any time including tremor, weight loss, HR >100. Suggested he obtain a baseline weight and monitor weekly. Showed him how to assess for tremor in the hands with paper towel. Will plan to check thyroid function tests q3 mo to assess for Grave's flare. Clinical course is very unpredictable. No evidence of thyroid eye disease at this time.     #Grave's disease with positive TPO Ab  -Patient is currently biochemically euthyroid  -clinical course is unpredictable, remain wary of signs for hypERthyroidism- see above  -not interested in I131 with subsequent levothyroxine requirement  -not interested in thyroidectom at this time    Copy sent Teena Parkinson MD    6 mo or sooner if Grave's flare occurs    [address] [facility]

## 2022-06-09 ENCOUNTER — OFFICE VISIT (OUTPATIENT)
Dept: FAMILY MEDICINE CLINIC | Age: 47
End: 2022-06-09
Payer: COMMERCIAL

## 2022-06-09 VITALS
OXYGEN SATURATION: 100 % | HEIGHT: 69 IN | DIASTOLIC BLOOD PRESSURE: 57 MMHG | HEART RATE: 62 BPM | RESPIRATION RATE: 16 BRPM | TEMPERATURE: 97.8 F | BODY MASS INDEX: 27.28 KG/M2 | WEIGHT: 184.2 LBS | SYSTOLIC BLOOD PRESSURE: 106 MMHG

## 2022-06-09 DIAGNOSIS — E05.00 GRAVES DISEASE: ICD-10-CM

## 2022-06-09 DIAGNOSIS — M77.12 LATERAL EPICONDYLITIS OF LEFT ELBOW: Primary | ICD-10-CM

## 2022-06-09 DIAGNOSIS — E06.3 HASHIMOTO'S THYROIDITIS: ICD-10-CM

## 2022-06-09 PROCEDURE — 99213 OFFICE O/P EST LOW 20 MIN: CPT | Performed by: FAMILY MEDICINE

## 2022-06-09 RX ORDER — DICLOFENAC SODIUM 10 MG/G
2 GEL TOPICAL 4 TIMES DAILY
Qty: 100 G | Refills: 0 | Status: SHIPPED | OUTPATIENT
Start: 2022-06-09

## 2022-06-09 NOTE — PROGRESS NOTES
Ania Hansen. (: 1975) is a 55 y.o. male, established patient, here for evaluation of the following chief complaint(s):  Follow-up (3 month)       ASSESSMENT/PLAN:      Below is the assessment and plan developed based on review of pertinent history, physical exam, labs, studies, and medications. 1. Lateral epicondylitis of left elbow - Reviewed exercises, voltaren gel/PO nsaids, ice, and compression sleeve in addition to work modifications. Conservative tx for the next 4 weeks. -     diclofenac (VOLTAREN) 1 % gel; Apply 2 g to affected area four (4) times daily. , Normal, Disp-100 g, R-0    2. Hashimoto's thyroiditis  3. Graves disease  - working with Dr. Jennifer Martino on this    Return in about 4 weeks (around 2022), or if symptoms worsen or fail to improve. SUBJECTIVE/OBJECTIVE:  56 yo AAM with PMH sig for hashimoto's thyroiditis, Graves disease, lumbar DDD who presents for left elbow pain. Left elbow pain - sx x 3 months. Pain over lateral elbow. At work, constantly doing motions that exacerbate this. Has not tried any meds for this. Hashimoto's Thyroiditis, Graves dz  Following with Dr. Jennifer Martino and appreciate her help. Clarified overall picture and expectations for pt that this will likely recur until requiring treatment. Still no sx. Prior studies include TSH, FT4, Total T3. Family history includes hyperthyroidism - in dad ( from dementia)    At last check, labs showed:  Normal FT4 and Total T3.     Lab Results   Component Value Date/Time    TSH 4.170 2022 12:21 PM    TSH 2.41 2022 08:50 AM    TSH 0.02 (L) 2022 09:25 AM    TSH <0.01 (L) 2021 09:02 AM    TSH 6.700 (H) 2020 10:40 AM    TSH 4.590 (H) 2019 02:10 PM    TSH 4.78 (H) 2019 09:17 AM     ROS  Gen - no fever/chills  Resp - no dyspnea or cough  CV - no chest pain or ONEAL  Rest per HPI    Blood pressure (!) 106/57, pulse 62, temperature 97.8 °F (36.6 °C), temperature source Temporal, resp. rate 16, height 5' 9\" (1.753 m), weight 184 lb 3.2 oz (83.6 kg), SpO2 100 %. Physical Exam  General appearance - alert, well appearing, and in no distress  Eyes -sclera anicteric  Neck - supple, no significant adenopathy, no thyromegaly  Chest - clear to auscultation, no wheezes, rales or rhonchi, symmetric air entry  Heart - normal rate, regular rhythm, normal S1, S2, no murmurs, rubs, clicks or gallops  Neurological - alert, oriented, normal speech, no focal findings or movement disorder noted  Msk - left lateral elbow ttp over lateral epicondyle at site of tendon insertion, worse with supination  Extr - no edema  Psych - normal mood and affect    On this date 06/09/2022 I have spent 20 minutes reviewing previous notes, test results and face to face with the patient discussing the diagnosis and importance of compliance with the treatment plan as well as documenting on the day of the visit. An electronic signature was used to authenticate this note.   -- Jules Contreras MD

## 2022-06-09 NOTE — PATIENT INSTRUCTIONS
Tennis Elbow: Care Instructions  Overview     Tennis elbow is soreness or pain on the outer part of the elbow. The pain occurs when the tendon is stretched and becomes irritated by repeated twisting of the hand, wrist, and forearm. A tendon is a tough tissue that connects muscle to bone. This injury is common in tennis players. But you also can get it from many activities that work the same muscles. Examples include gardening, painting, and using tools. Tennis elbow usually heals with rest and treatment at home. Follow-up care is a key part of your treatment and safety. Be sure to make and go to all appointments, and call your doctor if you are having problems. It's also a good idea to know your test results and keep a list of the medicines you take. How can you care for yourself at home?    · Rest your fingers, wrist, and forearm. Try to stop or reduce any activity that causes elbow pain. You may have to rest your arm for weeks to months. Follow your doctor's directions for how long to rest.     · Put ice or a cold pack on your elbow for 10 to 20 minutes at a time. Try to do this every 1 to 2 hours for the next 3 days (when you are awake) or until the swelling goes down. Put a thin cloth between the ice and your skin. You can try heat, or alternating heat and ice, after the first 3 days.     · If your doctor gave you a brace or splint, use it as directed. A \"counterforce\" brace is a strap around your forearm, just below your elbow. It may ease the pressure on the tendon and spread force throughout your arm.     · Prop up your elbow on pillows to help reduce swelling.     · Follow your doctor's or physical therapist's directions for exercise.     · Return to your usual activities slowly.     · Try to prevent the problem. Learn the best techniques for your sport. For example, make sure the  on your tennis racquet is not too big for your hand.  Try not to hit a tennis ball late in your swing.     · If you work, consider asking your employer about new ways of doing your job if your elbow pain is caused by something you do at work. Medicines    · Be safe with medicines. Read and follow all instructions on the label. ? If the doctor gave you a prescription medicine for pain, take it as prescribed. ? If you are not taking a prescription pain medicine, ask your doctor if you can take an over-the-counter medicine. When should you call for help? Call your doctor now or seek immediate medical care if:    · Your pain is worse.     · You cannot bend your elbow normally.     · Your arm or hand is cool or pale or changes color.     · You have tingling, weakness, or numbness in your hand and fingers. Watch closely for changes in your health, and be sure to contact your doctor if:    · You have work problems caused by your elbow pain.     · Your pain is not better after 2 weeks. Where can you learn more? Go to http://www.gray.com/  Enter C285 in the search box to learn more about \"Tennis Elbow: Care Instructions. \"  Current as of: July 1, 2021               Content Version: 13.2  © 0026-2523 [employer]. Care instructions adapted under license by [employer] (which disclaims liability or warranty for this information). If you have questions about a medical condition or this instruction, always ask your healthcare professional. [address] any warranty or liability for your use of this information.

## 2022-08-17 ENCOUNTER — TELEPHONE (OUTPATIENT)
Dept: FAMILY MEDICINE CLINIC | Age: 47
End: 2022-08-17

## 2022-08-17 NOTE — TELEPHONE ENCOUNTER
----- Message from Susy Pittmane sent at 8/17/2022  3:40 PM EDT -----  Subject: Message to Provider    QUESTIONS  Information for Provider? PATIENT WOULD LIKE TO KNOW IF PROVIDER WOULD   FILL OUT HIS FMLA PAPERWORK,   ---------------------------------------------------------------------------  --------------  Javier ALMODOVARK  [phone]; OK to leave message on voicemail  ---------------------------------------------------------------------------  --------------  SCRIPT ANSWERS  Relationship to Patient?  Self

## 2022-08-19 NOTE — TELEPHONE ENCOUNTER
Spoke with patient and schedule appointment with Dr Pravin Mistry for completion of FMLA papers. He will drop forms off on Monday.

## 2022-08-23 ENCOUNTER — VIRTUAL VISIT (OUTPATIENT)
Dept: FAMILY MEDICINE CLINIC | Age: 47
End: 2022-08-23
Payer: COMMERCIAL

## 2022-08-23 DIAGNOSIS — E06.3 HASHIMOTO'S THYROIDITIS: ICD-10-CM

## 2022-08-23 DIAGNOSIS — R10.9 CHRONIC ABDOMINAL PAIN: ICD-10-CM

## 2022-08-23 DIAGNOSIS — F43.21 GRIEF: Primary | ICD-10-CM

## 2022-08-23 DIAGNOSIS — E05.00 GRAVES DISEASE: ICD-10-CM

## 2022-08-23 DIAGNOSIS — G89.29 CHRONIC ABDOMINAL PAIN: ICD-10-CM

## 2022-08-23 LAB
T3 SERPL-MCNC: 119 NG/DL (ref 71–180)
T4 FREE SERPL-MCNC: 1.31 NG/DL (ref 0.82–1.77)
TSH SERPL DL<=0.005 MIU/L-ACNC: 1.61 UIU/ML (ref 0.45–4.5)

## 2022-08-23 PROCEDURE — 99213 OFFICE O/P EST LOW 20 MIN: CPT | Performed by: FAMILY MEDICINE

## 2022-08-23 NOTE — PROGRESS NOTES
Chief Complaint   Patient presents with    Form Completion     FMLA    1. Have you been to the ER, urgent care clinic since your last visit? Hospitalized since your last visit? No    2. Have you seen or consulted any other health care providers outside of the [address] [place] [address] since your last visit? Include any pap smears or colon screening.  No    Grief passing of wife from breast cancer  7/27/22  FMLA needs to start 8/4/22

## 2022-08-23 NOTE — PROGRESS NOTES
Miguel Barrios. is a 52 y.o. male who was seen by synchronous (real-time) audio-video technology on 8/23/2022 for Form Completion (FMLA)      Assessment/ Plan:  Grieving appropropriately. FMLA forms filled out today. To monitor for worsening sx. Declines meds today. Encouraged grief counseling. Has great support with family. Diagnoses and all orders for this visit:    1. Grief    2. Chronic abdominal pain    3. Hashimoto's thyroiditis    4. Graves disease      I spent at least 20 minutes on this visit with this established patient. Follow-up and Dispositions    Return in about 2 months (around 10/23/2022), or if symptoms worsen or fail to improve. Subjective:   Pt is a 52 y.o. male with PMH sig for hashimoto's thyroiditis, Graves disease, lumbar DDD who presents for grief. Wife passed away from breast ca on 7/27/22. Grieving appropriately. Not interested in meds. No SI/HI. Having significant somatic symptoms. Previously was dealing with jittery feeling in the stomach while his wife was going to breast cancer treatment and this has returned. Reports decreased attention and concentration. Has decreased energy and trouble focusing. Has a decreased appetite and thinks he has lost about 10 to 15 pounds over the past month. Sleeping okay. ROS per HPI       Objective:     No flowsheet data found. Physical exam:  General appearance - alert, well appearing, and in no distress  Eyes -sclera anicteric, no discharge  HEENT- normocephalic, atraumatic, moist mucous membranes, no visualized neck mass  Chest -normal respiratory effort, no visualized signs of respiratory distress  Neurological - alert, awake, normal speech, no focal findings or movement disorder noted  Psych - normal mood and affect  Skin- no apparent lesions    We discussed the expected course, resolution and complications of the diagnosis(es) in detail.   Medication risks, benefits, costs, interactions, and alternatives were discussed as indicated. I advised him to contact the office if his condition worsens, changes or fails to improve as anticipated. He expressed understanding with the diagnosis(es) and plan. Kristy Shakir, was evaluated through a synchronous (real-time) audio-video encounter. The patient (or guardian if applicable) is aware that this is a billable service, which includes applicable co-pays. This Virtual Visit was conducted with patient's (and/or legal guardian's) consent. The visit was conducted pursuant to the emergency declaration under the [address] [place] [address], [address] [place] [address] authority and the [employer] and [employer] Act. Patient identification was verified, and a caregiver was present when appropriate. The patient was located at: Home: [address] [place] [phone]  The provider was located at:  Facility (Appt Department): [facility]        Rod Flores MD

## 2022-10-24 ENCOUNTER — VIRTUAL VISIT (OUTPATIENT)
Dept: FAMILY MEDICINE CLINIC | Age: 47
End: 2022-10-24
Payer: COMMERCIAL

## 2022-10-24 DIAGNOSIS — F43.21 GRIEF: Primary | ICD-10-CM

## 2022-10-24 DIAGNOSIS — E05.00 GRAVES DISEASE: ICD-10-CM

## 2022-10-24 DIAGNOSIS — R10.9 ABDOMINAL DISCOMFORT: ICD-10-CM

## 2022-10-24 DIAGNOSIS — M77.12 LATERAL EPICONDYLITIS OF LEFT ELBOW: ICD-10-CM

## 2022-10-24 PROCEDURE — 99213 OFFICE O/P EST LOW 20 MIN: CPT | Performed by: FAMILY MEDICINE

## 2022-10-24 NOTE — PROGRESS NOTES
Lynda Smart. is a 52 y.o. male who was seen by synchronous (real-time) audio-video technology on 10/24/2022 for Grief Counseling (Follow-up)      Assessment/ Plan:  Grieving appropropriately. Mild sx with tennis elbow and will ct with conservative tx. Following with Dr. Kyra Weiner for [facility] and labs have normalized. Monitoring labs Q3 months. Reviewed abd sx and most c/w bloating. Has already had colonoscopy and CT abd/pelv. Mild episodic sx and encouraged pt to use diary to monitor triggers/duration/etc.  Encouraged adjusting diet to avoid high gas producing food for now. If not improving, to follow up with GI again. Diagnoses and all orders for this visit:    1. Grief    2. Lateral epicondylitis of left elbow    3. Graves disease    4. Abdominal discomfort        I spent at least 20 minutes on this visit with this established patient. Follow-up and Dispositions    Return in about 4 months (around 2/24/2023). Subjective:   Pt is a 52 y.o. male with PMH sig for hashimoto's thyroiditis, Graves disease, lumbar DDD who presents for grief. Wife passed away from breast ca on 7/27/22. Grieving appropriately. Colonoscopy in 2020 with Dr. Natacha Garrett - int hemorrhoid, no polyps. Abd discomfort  Having some \"weird stomach issues\"  Feels uncomfortable. Comes and goes - may occur about 2x per week. Lastly about . Denies pain. Feels mostly like bloating. No clear triggers. Better with apple pie a la mode or drinking water. Better after BM. May have soft BMs 2-3x/day. Denies n/v/d, hematemesis, hematochezia, melena, weight loss. Tried on prilosec and sometimes this helps but not consistently. Had CT abd/pelvis with no clear etiology and colonoscopy as above. Tennis elbow - mild sx. Episodic left elbow pain hemant worse with turning and twisting motion. Most c/w tennis elbow and pt opted for conservative tx. Mostly improved.   Not interested in further eval or tx at this time.    Hyperthyroidism  Following with Dr. Tiburcio Fields now - notes reviewed. Dx c/w Graves. Denies changes in vision, headache, tachycardia, heat intolerance, irritability, jitteriness, restlessness, poor sleeping, poor eating, weight loss, poor weight gain, palpitations, goiter, diarrhea, increased stool frequency, increased urinary frequency, exophthalmos, eye pain, neck pain, emotional lability, photophobia, hair loss, behavior problems. Prior studies include TSH, FT4, Total T3. Family history includes hyperthyroidism - in dad ( from dementia)    Lab Results   Component Value Date/Time    TSH 1.610 2022 09:24 AM    TSH 4.170 2022 12:21 PM    TSH 2.41 2022 08:50 AM    TSH 0.02 (L) 2022 09:25 AM    TSH <0.01 (L) 2021 09:02 AM    TSH 6.700 (H) 2020 10:40 AM    TSH 4.590 (H) 2019 02:10 PM    TSH 4.78 (H) 2019 09:17 AM     US Thyroid 22  FINDINGS:  The thyroid gland is enlarged, diffusely heterogeneous, hypervascular. There are 2 enlarged right cervical lymph nodes, one measures 2.6 cm and the second measures 2.1 cm. There are other prominent bilateral nodes. The right lobe measures 6.1 x 2.9 x 2.7 cm and the left lobe measures 5.6 x 2.5 x 2.2 cm. The isthmus measures 0.84 cm. IMPRESSION   1. Enlarged heterogeneous hypervascular thyroid compatible hyperthyroidism. 2. Enlarged bilateral cervical lymph nodes. Correlation and follow-up recommended. Nu uptake 2/10/22:  \"FINDINGS: Uptake is 26% (normal: 10% - 30%). Scintigraphic planar images of the thyroid gland are obtained in 3 projections. These demonstrate uniform radiotracer distribution within the gland, with no hot or cold nodules. IMPRESSION  Normal Nuclear Thyroid Scan and Uptake. \"      ROS  Gen - no fever/chills  Resp - no dyspnea or cough  CV - no chest pain or ONEAL  Rest per HPI       Objective:     No flowsheet data found.      Physical exam:  General appearance - alert, well appearing, and in no distress  Eyes -sclera anicteric, no discharge  HEENT- normocephalic, atraumatic, moist mucous membranes, no visualized neck mass  Chest -normal respiratory effort, no visualized signs of respiratory distress  Neurological - alert, awake, normal speech, no focal findings or movement disorder noted  Psych - normal mood and affect  Skin- no apparent lesions    We discussed the expected course, resolution and complications of the diagnosis(es) in detail. Medication risks, benefits, costs, interactions, and alternatives were discussed as indicated. I advised him to contact the office if his condition worsens, changes or fails to improve as anticipated. He expressed understanding with the diagnosis(es) and plan. Wadeerick Rashad., was evaluated through a synchronous (real-time) audio-video encounter. The patient (or guardian if applicable) is aware that this is a billable service, which includes applicable co-pays. This Virtual Visit was conducted with patient's (and/or legal guardian's) consent. The visit was conducted pursuant to the emergency declaration under the [address] [place] [address] authority and the [employer] and [employer] General Act. Patient identification was verified, and a caregiver was present when appropriate. The patient was located at: Home: [address] [place] [id number]  The provider was located at:  Facility (Appt Department): [facility]        Jason Menchaca MD

## 2022-10-26 ENCOUNTER — DOCUMENTATION ONLY (OUTPATIENT)
Dept: FAMILY MEDICINE CLINIC | Age: 47
End: 2022-10-26

## 2022-11-30 ENCOUNTER — TELEPHONE (OUTPATIENT)
Dept: ENDOCRINOLOGY | Age: 47
End: 2022-11-30

## 2022-11-30 DIAGNOSIS — E05.00 GRAVES DISEASE: ICD-10-CM

## 2022-11-30 DIAGNOSIS — E06.3 HASHIMOTO'S THYROIDITIS: Primary | ICD-10-CM

## 2022-11-30 NOTE — TELEPHONE ENCOUNTER
11/30/2022    Pt called and left a voicemail at 11:17 am stating he's getting blood work done this week. The last time the pt got labs done the lab kept his order form. Pt would like to know if he needs another order. Pt can be reached at [phone].     Thanks,   Teena Javier

## 2022-12-01 NOTE — TELEPHONE ENCOUNTER
Spoke to the pt and informed him that his lab order is now in the system. Informed the pt to let the tech know that the order is in their computer system. Patient understood with no further questions.

## 2022-12-03 LAB
T3 SERPL-MCNC: 96 NG/DL (ref 71–180)
T4 FREE SERPL-MCNC: 1.05 NG/DL (ref 0.82–1.77)
TSH SERPL DL<=0.005 MIU/L-ACNC: 3.14 UIU/ML (ref 0.45–4.5)

## 2022-12-05 ENCOUNTER — OFFICE VISIT (OUTPATIENT)
Dept: ENDOCRINOLOGY | Age: 47
End: 2022-12-05
Payer: COMMERCIAL

## 2022-12-05 VITALS
HEART RATE: 61 BPM | BODY MASS INDEX: 26.07 KG/M2 | WEIGHT: 176 LBS | DIASTOLIC BLOOD PRESSURE: 56 MMHG | SYSTOLIC BLOOD PRESSURE: 99 MMHG | HEIGHT: 69 IN

## 2022-12-05 DIAGNOSIS — E06.3 HASHIMOTO'S THYROIDITIS: Primary | ICD-10-CM

## 2022-12-05 DIAGNOSIS — E05.00 GRAVES DISEASE: ICD-10-CM

## 2022-12-05 PROCEDURE — 99214 OFFICE O/P EST MOD 30 MIN: CPT | Performed by: INTERNAL MEDICINE

## 2022-12-05 NOTE — PROGRESS NOTES
Chief Complaint   Patient presents with    Thyroid Problem     History of Present Illness: Westley Espinal. is a 52 y.o. male with minimal past medical hx seen in follow up for discussion related to Grave's disease with positive TPO Ab. Initial visit:  Esau Martin reports that thyroid function tests were obtained due to abdominal pain. Denies constipation, fatigue, weight gain, skin changes. Does note that his father took a medication for thyroid dysfunction, is not clear which medication it was. Last CT scan with contrast was 04/21/2022. Did not follow low iodine diet prior to I-123 uptake and scan.    06/06/2022: Overall feeling well. Does recall a period in time where he experienced dysphagia that resolved spontaneously, reports he was told it was anxiety. Experiencing some pain in the L elbow. Prefers to monitor the thyroid rather that ablate or surgically excise it. Denies a gritty sensation in the eyes. 12/05/2022: Wife passed away of breast ca in July after 3 year parada with this. Not having any sx at this time- did have braces placed and it eating less due to pain associated with this. No pain in the neck or thyroid enlargement from his perspective. Social Hx:    - former smoker  23 yo daughter and 15 yo son- he is going to run track this Spring    Review of Systems:  See HPI    Physical Examination:  Visit Vitals  BP (!) 99/56   Pulse 61   Ht 5' 9\" (1.753 m)   Wt 176 lb (79.8 kg)   BMI 25.99 kg/m²       - GENERAL: NCAT, Appears EUthyroid  - EYES: EOMI, non-icteric, no proptosis   - Ear/Nose/Throat: Slight thyromegaly is present  - CARDIOVASCULAR: no cyanosis, no visible JVD   - RESPIRATORY: respiratory effort normal without any distress or labored breathing   - MUSCULOSKELETAL: Normal ROM of neck and upper extremities observed   - SKIN: No rash on face  - NEUROLOGIC:  No facial asymmetry (Cranial nerve 7 motor function), No gaze palsy   - PSYCHIATRIC: Normal affect, Normal insight and judgement     Data Reviewed:                     Assessment/Plan: This is a very pleasant 70-year-old gentleman seen in follow up for discussion related to Grave's disease with positive TPO Ab levels. Somewhat suprisingly, TSI levels were positive in the setting of previously high normal TRAB levels. Overall, suggestive of currently relatively midle Grave's disease when also taking into consideration the borderline high normal I123 uptake and scan. Discussed symptoms suggestive of clinical Grave's disease which may occur at any time including tremor, weight loss, HR >100. Suggested he obtain a baseline weight and monitor weekly. Showed him how to assess for tremor in the hands with paper towel. Will plan to check thyroid function tests q3 mo to assess for Grave's flare/development of clinical hypothyroidism. Clinical course is very unpredictable. No evidence of thyroid eye disease at this time.     #Grave's disease with positive TPO Ab  -Patient is currently biochemically euthyroid  -clinical course is unpredictable, remain wary of signs for hypERthyroidism- see above  -not interested in I131 with subsequent levothyroxine requirement  -not interested in thyroidectomy at this time  -Check labs every 3 to 4 months    Copy sent Cyndi Manley MD    6 mo or sooner if Grave's flare occurs    [facility]

## 2023-04-18 LAB
T3 SERPL-MCNC: 115 NG/DL (ref 71–180)
T4 FREE SERPL-MCNC: 1.18 NG/DL (ref 0.82–1.77)
TSH SERPL DL<=0.005 MIU/L-ACNC: 4.93 UIU/ML (ref 0.45–4.5)

## 2023-07-11 NOTE — ED NOTES
Ambulatory to room c/o nausea and \"stomach bubbling\" over the past week. Feels like this is relieved when he has a BM, which has been mostly diarrhea this week. Denies bleeding, vomiting. Started meloxicam last week. Also c/o intermittent tingling and weakness in his knees and hips for past several weeks, was prescribed meloxicam by his PCP for this. 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

## 2023-08-02 LAB
T3 SERPL-MCNC: 118 NG/DL (ref 71–180)
T4 FREE SERPL-MCNC: 1.07 NG/DL (ref 0.82–1.77)
TSH SERPL DL<=0.005 MIU/L-ACNC: 3.64 UIU/ML (ref 0.45–4.5)

## 2023-08-09 ENCOUNTER — OFFICE VISIT (OUTPATIENT)
Age: 48
End: 2023-08-09
Payer: COMMERCIAL

## 2023-08-09 VITALS
HEIGHT: 69 IN | HEART RATE: 66 BPM | BODY MASS INDEX: 26.42 KG/M2 | WEIGHT: 178.4 LBS | OXYGEN SATURATION: 100 % | RESPIRATION RATE: 16 BRPM | SYSTOLIC BLOOD PRESSURE: 114 MMHG | DIASTOLIC BLOOD PRESSURE: 55 MMHG

## 2023-08-09 DIAGNOSIS — E06.3 AUTOIMMUNE THYROIDITIS: Primary | ICD-10-CM

## 2023-08-09 PROCEDURE — 99214 OFFICE O/P EST MOD 30 MIN: CPT | Performed by: INTERNAL MEDICINE

## 2023-10-27 ENCOUNTER — OFFICE VISIT (OUTPATIENT)
Age: 48
End: 2023-10-27
Payer: COMMERCIAL

## 2023-10-27 VITALS
DIASTOLIC BLOOD PRESSURE: 63 MMHG | SYSTOLIC BLOOD PRESSURE: 125 MMHG | BODY MASS INDEX: 26.1 KG/M2 | HEART RATE: 63 BPM | HEIGHT: 69 IN | WEIGHT: 176.2 LBS | RESPIRATION RATE: 20 BRPM | OXYGEN SATURATION: 97 % | TEMPERATURE: 98.2 F

## 2023-10-27 DIAGNOSIS — E06.3 AUTOIMMUNE THYROIDITIS: ICD-10-CM

## 2023-10-27 DIAGNOSIS — K58.9 IRRITABLE BOWEL SYNDROME WITHOUT DIARRHEA: ICD-10-CM

## 2023-10-27 DIAGNOSIS — Z76.89 ENCOUNTER TO ESTABLISH CARE: Primary | ICD-10-CM

## 2023-10-27 DIAGNOSIS — M51.36 DDD (DEGENERATIVE DISC DISEASE), LUMBAR: ICD-10-CM

## 2023-10-27 DIAGNOSIS — L72.3 SEBACEOUS CYST: ICD-10-CM

## 2023-10-27 PROBLEM — M51.369 DDD (DEGENERATIVE DISC DISEASE), LUMBAR: Status: ACTIVE | Noted: 2023-10-27

## 2023-10-27 PROCEDURE — 99213 OFFICE O/P EST LOW 20 MIN: CPT | Performed by: STUDENT IN AN ORGANIZED HEALTH CARE EDUCATION/TRAINING PROGRAM

## 2023-10-27 RX ORDER — DICYCLOMINE HCL 20 MG
20 TABLET ORAL 3 TIMES DAILY PRN
Qty: 30 TABLET | Refills: 1 | Status: SHIPPED | OUTPATIENT
Start: 2023-10-27

## 2023-10-27 RX ORDER — OMEPRAZOLE 10 MG/1
10 CAPSULE, DELAYED RELEASE ORAL PRN
COMMUNITY

## 2023-10-27 SDOH — ECONOMIC STABILITY: FOOD INSECURITY: WITHIN THE PAST 12 MONTHS, YOU WORRIED THAT YOUR FOOD WOULD RUN OUT BEFORE YOU GOT MONEY TO BUY MORE.: NEVER TRUE

## 2023-10-27 SDOH — ECONOMIC STABILITY: FOOD INSECURITY: WITHIN THE PAST 12 MONTHS, THE FOOD YOU BOUGHT JUST DIDN'T LAST AND YOU DIDN'T HAVE MONEY TO GET MORE.: NEVER TRUE

## 2023-10-27 SDOH — ECONOMIC STABILITY: INCOME INSECURITY: HOW HARD IS IT FOR YOU TO PAY FOR THE VERY BASICS LIKE FOOD, HOUSING, MEDICAL CARE, AND HEATING?: NOT HARD AT ALL

## 2023-10-27 SDOH — ECONOMIC STABILITY: HOUSING INSECURITY
IN THE LAST 12 MONTHS, WAS THERE A TIME WHEN YOU DID NOT HAVE A STEADY PLACE TO SLEEP OR SLEPT IN A SHELTER (INCLUDING NOW)?: NO

## 2023-10-27 ASSESSMENT — PATIENT HEALTH QUESTIONNAIRE - PHQ9
SUM OF ALL RESPONSES TO PHQ QUESTIONS 1-9: 0
SUM OF ALL RESPONSES TO PHQ QUESTIONS 1-9: 0
SUM OF ALL RESPONSES TO PHQ9 QUESTIONS 1 & 2: 0
SUM OF ALL RESPONSES TO PHQ QUESTIONS 1-9: 0
2. FEELING DOWN, DEPRESSED OR HOPELESS: 0
1. LITTLE INTEREST OR PLEASURE IN DOING THINGS: 0
SUM OF ALL RESPONSES TO PHQ QUESTIONS 1-9: 0

## 2023-10-27 NOTE — PATIENT INSTRUCTIONS
For the abdominal cramping: Take 1 capsule of the dicyclomine up to 3 times per day when you have abdominal discomfort.  Also try the the peppermint tea

## 2023-10-27 NOTE — PROGRESS NOTES
Chief Complaint   Patient presents with    Establish Care     Mass on the back of neck, pt complains of maybe having heart burn
Sister     Hypertension Father     Thyroid Disease Father     Dementia Father     High Blood Pressure Father     Diabetes Mother     Cancer Mother         Breast    Pacemaker Mother     Breast Cancer Mother     High Blood Pressure Mother     Breast Cancer Sister     Cancer Maternal Grandmother     Heart Disease Neg Hx         Social History     Socioeconomic History    Marital status:      Spouse name: Not on file    Number of children: Not on file    Years of education: Not on file    Highest education level: Some college, no degree   Occupational History    Occupation: [employer]   Tobacco Use    Smoking status: Former     Packs/day: 1.00     Years: 10.00     Additional pack years: 0.00     Total pack years: 10.00     Types: Cigarettes     Start date: 3/15/2009     Quit date: 2019     Years since quittin.7    Smokeless tobacco: Never   Vaping Use    Vaping Use: Never used   Substance and Sexual Activity    Alcohol use: No    Drug use: No    Sexual activity: Not Currently     Partners: Female     Birth control/protection: None   Other Topics Concern    Not on file   Social History Narrative    Wife passed away from breast cancer      Social Determinants of Health     Financial Resource Strain: Low Risk  (10/27/2023)    Overall Financial Resource Strain (CARDIA)     Difficulty of Paying Living Expenses: Not hard at all   Food Insecurity: No Food Insecurity (10/27/2023)    Hunger Vital Sign     Worried About [place] in the Last Year: Never true     [address] in the Last Year: Never true   Transportation Needs: Unknown (10/27/2023)    PRAPARE - Transportation     Lack of Transportation (Medical): Not on file     Lack of Transportation (Non-Medical):  No   Physical Activity: Not on file   Stress: Not on file   Social Connections: Not on file   Intimate Partner Violence: Not on file   Housing Stability: Unknown (10/27/2023)    Housing Stability Vital Sign     Unable to Pay for Housing in

## 2024-02-03 LAB
T3 SERPL-MCNC: 92 NG/DL (ref 71–180)
T4 FREE SERPL-MCNC: 1.15 NG/DL (ref 0.82–1.77)
TSH SERPL DL<=0.005 MIU/L-ACNC: 4.5 UIU/ML (ref 0.45–4.5)

## 2024-02-04 LAB — TSI SER-ACNC: 12.3 IU/L (ref 0–0.55)

## 2024-02-09 ENCOUNTER — OFFICE VISIT (OUTPATIENT)
Age: 49
End: 2024-02-09
Payer: COMMERCIAL

## 2024-02-09 VITALS
SYSTOLIC BLOOD PRESSURE: 115 MMHG | OXYGEN SATURATION: 99 % | DIASTOLIC BLOOD PRESSURE: 57 MMHG | RESPIRATION RATE: 16 BRPM | HEART RATE: 61 BPM | BODY MASS INDEX: 26.88 KG/M2 | HEIGHT: 69 IN | WEIGHT: 181.5 LBS

## 2024-02-09 DIAGNOSIS — E06.3 AUTOIMMUNE THYROIDITIS: Primary | ICD-10-CM

## 2024-02-09 PROCEDURE — 99214 OFFICE O/P EST MOD 30 MIN: CPT | Performed by: INTERNAL MEDICINE

## 2024-02-09 NOTE — PROGRESS NOTES
Chief Complaint   Patient presents with    Thyroid Problem        History of Present Illness: Fidencio Ramírez Jr. is a  48 y.o. male with minimal past medical hx seen in follow up for discussion related to Grave's  disease with positive TPO Ab.       Initial visit:   Fidencio reports that thyroid function tests were obtained due to abdominal pain.  Denies constipation, fatigue, weight gain, skin changes.  Does note that his father took a medication for thyroid dysfunction,  is not clear which medication it was.      Last CT scan with contrast was 04/21/2022.  Did not follow low iodine diet prior to I-123 uptake and scan.      06/06/2022: Overall feeling well. Does recall a period in time where he experienced dysphagia that resolved spontaneously, reports he was  told it was anxiety. Experiencing some pain in the L elbow. Prefers to monitor the thyroid rather that ablate or surgically excise it. Denies a gritty sensation in the eyes.      12/05/2022: Wife passed away of breast ca in July after 3 year owens with this. Not having any sx at this time- did have braces placed and it  eating less due to pain associated with this. No pain in the neck or thyroid enlargement from his perspective.      08/09/2023: Overall doing well, no changes aside from worsening in his vision.  No gritty sensation or tearing.  Weight is stable.    02/09/2024: Notes periods of increased anxiety.      Social Hx:    - former smoker   24 yo daughter and 13 yo son- he is going to run track this Spring      Review of Systems:    See HPI       Physical Examination:   Visit Vitals  BP (!) 115/57   Pulse 61   Resp 16   Ht 1.753 m (5' 9\")   Wt 82.3 kg (181 lb 8 oz)   SpO2 99%   BMI 26.80 kg/m²           - GENERAL: NCAT, Appears EUthyroid  - EYES: EOMI, non-icteric, no proptosis   - Ear/Nose/Throat: Slight thyromegaly is present  - CARDIOVASCULAR: no cyanosis, no visible JVD   - RESPIRATORY: respiratory effort normal without any distress

## 2024-08-09 ENCOUNTER — OFFICE VISIT (OUTPATIENT)
Age: 49
End: 2024-08-09

## 2024-08-09 VITALS
SYSTOLIC BLOOD PRESSURE: 113 MMHG | HEART RATE: 61 BPM | HEIGHT: 69 IN | BODY MASS INDEX: 27.86 KG/M2 | WEIGHT: 188.1 LBS | RESPIRATION RATE: 16 BRPM | DIASTOLIC BLOOD PRESSURE: 62 MMHG | OXYGEN SATURATION: 100 %

## 2024-08-09 DIAGNOSIS — E06.3 AUTOIMMUNE THYROIDITIS: Primary | ICD-10-CM

## 2024-08-09 LAB
T3 SERPL-MCNC: 114 NG/DL (ref 71–180)
T4 FREE SERPL-MCNC: 1.09 NG/DL (ref 0.82–1.77)
TSH SERPL DL<=0.005 MIU/L-ACNC: 5.95 UIU/ML (ref 0.45–4.5)

## 2024-08-09 NOTE — PROGRESS NOTES
Chief Complaint   Patient presents with    Thyroid Problem    Medication Refill        History of Present Illness: Fidencio Ramírez Jr. is a  49 y.o. male with minimal past medical hx seen in follow up for discussion related to Grave's  disease with positive TPO Ab.       Initial visit:   Fidencio reports that thyroid function tests were obtained due to abdominal pain.  Denies constipation, fatigue, weight gain, skin changes.  Does note that his father took a medication for thyroid dysfunction,  is not clear which medication it was.      Last CT scan with contrast was 04/21/2022.  Did not follow low iodine diet prior to I-123 uptake and scan.      06/06/2022: Overall feeling well. Does recall a period in time where he experienced dysphagia that resolved spontaneously, reports he was  told it was anxiety. Experiencing some pain in the L elbow. Prefers to monitor the thyroid rather that ablate or surgically excise it. Denies a gritty sensation in the eyes.      12/05/2022: Wife passed away of breast ca in July after 3 year owens with this. Not having any sx at this time- did have braces placed and it  eating less due to pain associated with this. No pain in the neck or thyroid enlargement from his perspective.      08/09/2023: Overall doing well, no changes aside from worsening in his vision.  No gritty sensation or tearing.  Weight is stable.    02/09/2024: Notes periods of increased anxiety.    08/09/2024: Overall feeling well- no pain in neck despite documentation of DDD.      Social Hx:    - former smoker   24 yo daughter and 11 yo son- he is going to run track this Spring      Review of Systems:    See HPI       Physical Examination:   Visit Vitals  /62   Pulse 61   Resp 16   Ht 1.753 m (5' 9\")   Wt 85.3 kg (188 lb 1.6 oz)   SpO2 100%   BMI 27.78 kg/m²           - GENERAL: NCAT, Appears EUthyroid  - EYES: EOMI, non-icteric, no proptosis   - Ear/Nose/Throat: Slight thyromegaly is present  -

## 2024-08-10 LAB — TSI SER-ACNC: 19.6 IU/L (ref 0–0.55)

## 2024-08-12 DIAGNOSIS — E06.3 AUTOIMMUNE THYROIDITIS: Primary | ICD-10-CM

## 2024-08-12 RX ORDER — LEVOTHYROXINE SODIUM 0.03 MG/1
25 TABLET ORAL DAILY
Qty: 90 TABLET | Refills: 0 | Status: SHIPPED | OUTPATIENT
Start: 2024-08-12

## 2024-08-21 ENCOUNTER — TELEPHONE (OUTPATIENT)
Age: 49
End: 2024-08-21

## 2024-08-21 NOTE — TELEPHONE ENCOUNTER
Patient would like to get a ER fu appointment with  nothing was available on my end please give him a call @ [phone]

## 2024-08-23 ENCOUNTER — TELEPHONE (OUTPATIENT)
Age: 49
End: 2024-08-23

## 2024-09-16 ENCOUNTER — OFFICE VISIT (OUTPATIENT)
Age: 49
End: 2024-09-16

## 2024-09-16 VITALS
BODY MASS INDEX: 27.81 KG/M2 | RESPIRATION RATE: 20 BRPM | TEMPERATURE: 98.3 F | SYSTOLIC BLOOD PRESSURE: 132 MMHG | HEIGHT: 69 IN | HEART RATE: 67 BPM | WEIGHT: 187.8 LBS | OXYGEN SATURATION: 99 % | DIASTOLIC BLOOD PRESSURE: 70 MMHG

## 2024-09-16 DIAGNOSIS — M54.50 CHRONIC BILATERAL LOW BACK PAIN WITHOUT SCIATICA: Primary | ICD-10-CM

## 2024-09-16 DIAGNOSIS — R73.03 PREDIABETES: ICD-10-CM

## 2024-09-16 DIAGNOSIS — G89.29 CHRONIC BILATERAL LOW BACK PAIN WITHOUT SCIATICA: Primary | ICD-10-CM

## 2024-09-16 LAB — HBA1C MFR BLD: 6.1 %

## 2024-09-16 RX ORDER — CYCLOBENZAPRINE HCL 10 MG
10 TABLET ORAL 2 TIMES DAILY PRN
Qty: 30 TABLET | Refills: 0 | Status: SHIPPED | OUTPATIENT
Start: 2024-09-16

## 2024-09-16 ASSESSMENT — PATIENT HEALTH QUESTIONNAIRE - PHQ9
SUM OF ALL RESPONSES TO PHQ9 QUESTIONS 1 & 2: 0
SUM OF ALL RESPONSES TO PHQ QUESTIONS 1-9: 0
SUM OF ALL RESPONSES TO PHQ QUESTIONS 1-9: 0
1. LITTLE INTEREST OR PLEASURE IN DOING THINGS: NOT AT ALL
2. FEELING DOWN, DEPRESSED OR HOPELESS: NOT AT ALL
SUM OF ALL RESPONSES TO PHQ QUESTIONS 1-9: 0
SUM OF ALL RESPONSES TO PHQ QUESTIONS 1-9: 0

## 2024-11-20 RX ORDER — LEVOTHYROXINE SODIUM 25 UG/1
25 TABLET ORAL DAILY
Qty: 90 TABLET | Refills: 1 | Status: SHIPPED | OUTPATIENT
Start: 2024-11-20

## 2024-12-31 ENCOUNTER — OFFICE VISIT (OUTPATIENT)
Age: 49
End: 2024-12-31
Payer: COMMERCIAL

## 2024-12-31 VITALS
SYSTOLIC BLOOD PRESSURE: 118 MMHG | HEIGHT: 69 IN | OXYGEN SATURATION: 100 % | BODY MASS INDEX: 27.24 KG/M2 | RESPIRATION RATE: 16 BRPM | DIASTOLIC BLOOD PRESSURE: 62 MMHG | WEIGHT: 183.9 LBS | HEART RATE: 60 BPM

## 2024-12-31 DIAGNOSIS — E06.3 AUTOIMMUNE THYROIDITIS: Primary | ICD-10-CM

## 2024-12-31 DIAGNOSIS — E74.39 GLUCOSE INTOLERANCE: ICD-10-CM

## 2024-12-31 LAB
HBA1C MFR BLD: 6.1 %
T3 SERPL-MCNC: 105 NG/DL (ref 71–180)
T4 FREE SERPL-MCNC: 1.41 NG/DL (ref 0.82–1.77)
TSH SERPL DL<=0.005 MIU/L-ACNC: 2.08 UIU/ML (ref 0.45–4.5)

## 2024-12-31 PROCEDURE — 83036 HEMOGLOBIN GLYCOSYLATED A1C: CPT | Performed by: INTERNAL MEDICINE

## 2024-12-31 PROCEDURE — 99214 OFFICE O/P EST MOD 30 MIN: CPT | Performed by: INTERNAL MEDICINE

## 2024-12-31 PROCEDURE — G2211 COMPLEX E/M VISIT ADD ON: HCPCS | Performed by: INTERNAL MEDICINE

## 2024-12-31 NOTE — PROGRESS NOTES
same  -provided dexcom g7 sample      Copy sent to:Rex Milian MD      RTC 4 mo      MD [facility]

## 2025-01-01 LAB — TSI SER-ACNC: 26.2 IU/L (ref 0–0.55)

## 2025-03-17 ENCOUNTER — OFFICE VISIT (OUTPATIENT)
Age: 50
End: 2025-03-17
Payer: COMMERCIAL

## 2025-03-17 VITALS
OXYGEN SATURATION: 96 % | BODY MASS INDEX: 26.69 KG/M2 | DIASTOLIC BLOOD PRESSURE: 62 MMHG | HEART RATE: 71 BPM | TEMPERATURE: 98.2 F | RESPIRATION RATE: 20 BRPM | WEIGHT: 180.2 LBS | SYSTOLIC BLOOD PRESSURE: 116 MMHG | HEIGHT: 69 IN

## 2025-03-17 DIAGNOSIS — Z13.0 SCREENING FOR DEFICIENCY ANEMIA: ICD-10-CM

## 2025-03-17 DIAGNOSIS — R73.03 PREDIABETES: ICD-10-CM

## 2025-03-17 DIAGNOSIS — M62.830 MUSCLE SPASM OF BACK: ICD-10-CM

## 2025-03-17 DIAGNOSIS — Z13.220 SCREENING FOR CHOLESTEROL LEVEL: ICD-10-CM

## 2025-03-17 DIAGNOSIS — E55.9 VITAMIN D DEFICIENCY: ICD-10-CM

## 2025-03-17 DIAGNOSIS — Z00.00 WELL ADULT EXAM: Primary | ICD-10-CM

## 2025-03-17 DIAGNOSIS — E06.3 AUTOIMMUNE THYROIDITIS: ICD-10-CM

## 2025-03-17 PROCEDURE — 99213 OFFICE O/P EST LOW 20 MIN: CPT | Performed by: STUDENT IN AN ORGANIZED HEALTH CARE EDUCATION/TRAINING PROGRAM

## 2025-03-17 PROCEDURE — 99396 PREV VISIT EST AGE 40-64: CPT | Performed by: STUDENT IN AN ORGANIZED HEALTH CARE EDUCATION/TRAINING PROGRAM

## 2025-03-17 SDOH — ECONOMIC STABILITY: INCOME INSECURITY: IN THE LAST 12 MONTHS, WAS THERE A TIME WHEN YOU WERE NOT ABLE TO PAY THE MORTGAGE OR RENT ON TIME?: NO

## 2025-03-17 SDOH — ECONOMIC STABILITY: TRANSPORTATION INSECURITY
IN THE PAST 12 MONTHS, HAS LACK OF TRANSPORTATION KEPT YOU FROM MEETINGS, WORK, OR FROM GETTING THINGS NEEDED FOR DAILY LIVING?: NO

## 2025-03-17 SDOH — ECONOMIC STABILITY: TRANSPORTATION INSECURITY
IN THE PAST 12 MONTHS, HAS THE LACK OF TRANSPORTATION KEPT YOU FROM MEDICAL APPOINTMENTS OR FROM GETTING MEDICATIONS?: NO

## 2025-03-17 SDOH — ECONOMIC STABILITY: FOOD INSECURITY: WITHIN THE PAST 12 MONTHS, YOU WORRIED THAT YOUR FOOD WOULD RUN OUT BEFORE YOU GOT MONEY TO BUY MORE.: NEVER TRUE

## 2025-03-17 SDOH — ECONOMIC STABILITY: FOOD INSECURITY: WITHIN THE PAST 12 MONTHS, THE FOOD YOU BOUGHT JUST DIDN'T LAST AND YOU DIDN'T HAVE MONEY TO GET MORE.: NEVER TRUE

## 2025-03-17 ASSESSMENT — PATIENT HEALTH QUESTIONNAIRE - PHQ9
SUM OF ALL RESPONSES TO PHQ QUESTIONS 1-9: 0
SUM OF ALL RESPONSES TO PHQ QUESTIONS 1-9: 0
2. FEELING DOWN, DEPRESSED OR HOPELESS: NOT AT ALL
SUM OF ALL RESPONSES TO PHQ QUESTIONS 1-9: 0
1. LITTLE INTEREST OR PLEASURE IN DOING THINGS: NOT AT ALL
SUM OF ALL RESPONSES TO PHQ QUESTIONS 1-9: 0

## 2025-03-17 NOTE — PROGRESS NOTES
Chief Complaint   Patient presents with    Annual Exam       \"Have you been to the ER, urgent care clinic since your last visit?  Hospitalized since your last visit?\"    NO    “Have you seen or consulted any other health care providers outside of [facility] since your last visit?”    NO          Click Here for Release of Records Request     No results found for this visit on 25.   Vitals:    25 1115   BP: 116/62   Pulse: 71   Resp: 20   Temp: 98.2 °F (36.8 °C)   TempSrc: Temporal   SpO2: 96%   Weight: 81.7 kg (180 lb 3.2 oz)   Height: 1.753 m (5' 9\")      Health Maintenance Due   Topic Date Due    Hepatitis B vaccine (1 of 3 - 19+ 3-dose series) Never done        The patient, Fidencoi Ramírez Jr., identity was verified by name and .

## 2025-03-17 NOTE — PROGRESS NOTES
[facility]  [address] [address] [phone]    Office Visit      Assessment and Plan     1. Well adult exam  All preventative recommendations given in regards to maintaining healthy weight, importance of immunizations, tobacco/alcohol cessation, recommended screenings per USPSTF guidelines, and a health promoting lifestyle. Updated past medical, social and family history as well.      - Comprehensive Metabolic Panel; Future    2. Autoimmune thyroiditis  Chronic, stable.  Continue management per endocrinology    3. Screening for cholesterol level  - Comprehensive Metabolic Panel; Future  - Lipid Panel; Future    4. Screening for deficiency anemia  - CBC; Future    5. Vitamin D deficiency  - Vitamin D 25 Hydroxy; Future    6. Muscle spasm of back  Chronic, uncontrolled.  Continue muscle relaxers as needed.  Will refer patient to physical therapy for further evaluation and treatment.  Also discussed conservative measures such as gentle stretching.  Patient can also return here for OMT  - [facility] at [facility]    7. Prediabetes  Chronic, uncontrolled.  Nutritional recommendations given.  Recheck A1c today.  May need to consider starting metformin if A1c has not improved  - Hemoglobin A1C; Future          Return in about 6 months (around 9/17/2025) for Follow-up.         Discussed the expected course, resolution and complications of the diagnosis(es) in detail.  Medication risks, benefits, costs, interactions, and alternatives were discussed as indicated.  Patient to contact the office if their condition worsens, changes or fails to improve. Pt verbalized understanding with the diagnosis(es) and plan.           Flakita Jackson DO    03/17/25   2:07 PM        Subjective     CC:   Chief Complaint   Patient presents with    Annual Exam       Pt  has a past medical history of DDD (degenerative disc disease),

## 2025-03-24 ENCOUNTER — OFFICE VISIT (OUTPATIENT)
Age: 50
End: 2025-03-24

## 2025-03-24 DIAGNOSIS — Z00.00 WELL ADULT EXAM: ICD-10-CM

## 2025-03-24 DIAGNOSIS — Z13.220 SCREENING FOR CHOLESTEROL LEVEL: ICD-10-CM

## 2025-03-24 DIAGNOSIS — Z01.89 ROUTINE LAB DRAW: Primary | ICD-10-CM

## 2025-03-24 DIAGNOSIS — Z13.0 SCREENING FOR DEFICIENCY ANEMIA: ICD-10-CM

## 2025-03-24 DIAGNOSIS — E55.9 VITAMIN D DEFICIENCY: ICD-10-CM

## 2025-03-24 DIAGNOSIS — R73.03 PREDIABETES: ICD-10-CM

## 2025-03-24 LAB
25(OH)D3 SERPL-MCNC: 31.2 NG/ML (ref 30–100)
ALBUMIN SERPL-MCNC: 3.7 G/DL (ref 3.5–5)
ALBUMIN/GLOB SERPL: 1.1 (ref 1.1–2.2)
ALP SERPL-CCNC: 68 U/L (ref 45–117)
ALT SERPL-CCNC: 26 U/L (ref 12–78)
ANION GAP SERPL CALC-SCNC: 4 MMOL/L (ref 2–12)
AST SERPL-CCNC: 37 U/L (ref 15–37)
BILIRUB SERPL-MCNC: 0.6 MG/DL (ref 0.2–1)
BUN SERPL-MCNC: 13 MG/DL (ref 6–20)
BUN/CREAT SERPL: 15 (ref 12–20)
CALCIUM SERPL-MCNC: 9.3 MG/DL (ref 8.5–10.1)
CHLORIDE SERPL-SCNC: 108 MMOL/L (ref 97–108)
CHOLEST SERPL-MCNC: 157 MG/DL
CO2 SERPL-SCNC: 28 MMOL/L (ref 21–32)
CREAT SERPL-MCNC: 0.86 MG/DL (ref 0.7–1.3)
ERYTHROCYTE [DISTWIDTH] IN BLOOD BY AUTOMATED COUNT: 14.6 % (ref 11.5–14.5)
EST. AVERAGE GLUCOSE BLD GHB EST-MCNC: 131 MG/DL
GLOBULIN SER CALC-MCNC: 3.5 G/DL (ref 2–4)
GLUCOSE SERPL-MCNC: 100 MG/DL (ref 65–100)
HBA1C MFR BLD: 6.2 % (ref 4–5.6)
HCT VFR BLD AUTO: 38.1 % (ref 36.6–50.3)
HDLC SERPL-MCNC: 70 MG/DL
HDLC SERPL: 2.2 (ref 0–5)
HGB BLD-MCNC: 11.8 G/DL (ref 12.1–17)
LDLC SERPL CALC-MCNC: 81.4 MG/DL (ref 0–100)
MCH RBC QN AUTO: 27.2 PG (ref 26–34)
MCHC RBC AUTO-ENTMCNC: 31 G/DL (ref 30–36.5)
MCV RBC AUTO: 87.8 FL (ref 80–99)
NRBC # BLD: 0 K/UL (ref 0–0.01)
NRBC BLD-RTO: 0 PER 100 WBC
PLATELET # BLD AUTO: 204 K/UL (ref 150–400)
PMV BLD AUTO: 11.5 FL (ref 8.9–12.9)
POTASSIUM SERPL-SCNC: 4.2 MMOL/L (ref 3.5–5.1)
PROT SERPL-MCNC: 7.2 G/DL (ref 6.4–8.2)
RBC # BLD AUTO: 4.34 M/UL (ref 4.1–5.7)
SODIUM SERPL-SCNC: 140 MMOL/L (ref 136–145)
TRIGL SERPL-MCNC: 28 MG/DL
VLDLC SERPL CALC-MCNC: 5.6 MG/DL
WBC # BLD AUTO: 3.2 K/UL (ref 4.1–11.1)

## 2025-04-02 ENCOUNTER — RESULTS FOLLOW-UP (OUTPATIENT)
Age: 50
End: 2025-04-02

## 2025-04-02 DIAGNOSIS — R73.03 PREDIABETES: Primary | ICD-10-CM

## 2025-04-02 NOTE — RESULT ENCOUNTER NOTE
Your  cholesterol, vitamin D level, liver function, and kidney function all looked good.     Your A1C level (average blood sugar over 3 months - TEST FOR DIABETES) came back a little high showing you have pre-diabetes (borderline diabetes).  Your next appointment, we may discuss further medications which can help your blood sugars.    The best way to prevent this from progressing to full blown diabetes is weight loss, exercise, and diet changes.  Work on decreasing the carbohydrates (or sugars in your diet).  This includes: sodas, sweet tea, junk food, breads, pasta, white rice, and potatoes.    Your blood counts are stable and shows some mild anemia. I would recommend increasing your intake of iron rich foods such as: Chicken, turkey, eggs, beef, shrimp, tuna, spinach, sweet potatoes, peas, broccoli, string beans, collards, kale, enriched whole-wheat bread, enriched rice, dried fruits, strawberries, watermelon's, beans, lentils, or tofu.      If you have any questions or concerns about the interpretation of these labs, please schedule a time for a virtual or in-person appointment and I will be happy to discuss and answer any questions that you may have.  If there is an \"abnormal result\" on your lab that I did not comment on, that means that it is not clinically significant and does not require treatment or intervention.

## 2025-04-03 ENCOUNTER — HOSPITAL ENCOUNTER (OUTPATIENT)
Facility: HOSPITAL | Age: 50
Setting detail: RECURRING SERIES
Discharge: HOME | End: 2025-04-06
Attending: STUDENT IN AN ORGANIZED HEALTH CARE EDUCATION/TRAINING PROGRAM
Payer: COMMERCIAL

## 2025-04-03 PROCEDURE — 97110 THERAPEUTIC EXERCISES: CPT

## 2025-04-03 PROCEDURE — 97161 PT EVAL LOW COMPLEX 20 MIN: CPT

## 2025-04-03 NOTE — THERAPY EVALUATION
household chores more efficiently  Pt will be able to maintain positions for 60 min without increased symptoms    Long Term Goals: To be accomplished in 20-24 treatments.  Pt will complain of pain 0-1/10 with all activity  Pt will increase strength to stabilize the core and maintain proper postures with all activity  Pt will increase FOTO score by 9 points to demonstrate improved function with all activity  Pt will return to all gym activity to include lower body exercises and core strengthening  Pt will use proper form and posture to complete all work tasks without increased symptoms 100% of the time      Frequency / Duration: Patient to be seen 2 times per week for 24 treatments.    Patient/ Caregiver education and instruction: Diagnosis, prognosis, self care, activity modification, and exercises   [x]  Plan of care has been reviewed with ROQUE Ramírez, PT       4/3/2025       9:18 AM        ===================================================================  I certify that the above Therapy Services are being furnished while the patient is under my care. I agree with the treatment plan and certify that this therapy is necessary.    Physician's Signature:_________________________   DATE:_________   TIME:________                           Flakita Jackson, DO    ** Signature, Date and Time must be completed for valid certification **  Please sign and fax to [phone].  Thank you

## 2025-04-10 ENCOUNTER — HOSPITAL ENCOUNTER (OUTPATIENT)
Facility: HOSPITAL | Age: 50
Setting detail: RECURRING SERIES
Discharge: HOME | End: 2025-04-13
Attending: STUDENT IN AN ORGANIZED HEALTH CARE EDUCATION/TRAINING PROGRAM
Payer: COMMERCIAL

## 2025-04-10 PROCEDURE — 97110 THERAPEUTIC EXERCISES: CPT

## 2025-04-16 ENCOUNTER — OFFICE VISIT (OUTPATIENT)
Age: 50
End: 2025-04-16
Payer: COMMERCIAL

## 2025-04-16 VITALS
BODY MASS INDEX: 26.72 KG/M2 | OXYGEN SATURATION: 98 % | HEIGHT: 69 IN | DIASTOLIC BLOOD PRESSURE: 58 MMHG | HEART RATE: 65 BPM | TEMPERATURE: 98.3 F | WEIGHT: 180.4 LBS | SYSTOLIC BLOOD PRESSURE: 132 MMHG | RESPIRATION RATE: 20 BRPM

## 2025-04-16 DIAGNOSIS — Z02.9 ADMINISTRATIVE ENCOUNTER: Primary | ICD-10-CM

## 2025-04-16 DIAGNOSIS — M51.360 DEGENERATION OF INTERVERTEBRAL DISC OF LUMBAR REGION WITH DISCOGENIC BACK PAIN: ICD-10-CM

## 2025-04-16 PROCEDURE — 99213 OFFICE O/P EST LOW 20 MIN: CPT | Performed by: STUDENT IN AN ORGANIZED HEALTH CARE EDUCATION/TRAINING PROGRAM

## 2025-04-16 ASSESSMENT — PATIENT HEALTH QUESTIONNAIRE - PHQ9
1. LITTLE INTEREST OR PLEASURE IN DOING THINGS: NOT AT ALL
SUM OF ALL RESPONSES TO PHQ QUESTIONS 1-9: 0
SUM OF ALL RESPONSES TO PHQ QUESTIONS 1-9: 0
2. FEELING DOWN, DEPRESSED OR HOPELESS: NOT AT ALL
SUM OF ALL RESPONSES TO PHQ QUESTIONS 1-9: 0
SUM OF ALL RESPONSES TO PHQ QUESTIONS 1-9: 0

## 2025-04-16 NOTE — PROGRESS NOTES
Chief Complaint   Patient presents with    Administrative     FMLA       \"Have you been to the ER, urgent care clinic since your last visit?  Hospitalized since your last visit?\"    NO    “Have you seen or consulted any other health care providers outside of [facility] since your last visit?”    NO          Click Here for Release of Records Request     No results found for this visit on 25.   Vitals:    25 0953   BP: (!) 132/58   Pulse: 65   Resp: 20   Temp: 98.3 °F (36.8 °C)   TempSrc: Temporal   SpO2: 98%   Weight: 81.8 kg (180 lb 6.4 oz)   Height: 1.753 m (5' 9\")      Health Maintenance Due   Topic Date Due    Hepatitis B vaccine (1 of 3 - 19+ 3-dose series) Never done        The patient, Fidencio Ramírez Jr., identity was verified by name and .

## 2025-04-16 NOTE — PROGRESS NOTES
[facility]  [address] [address] [phone]    Office Visit      Assessment and Plan      Diagnosis Orders   1. Administrative encounter        2. Degeneration of intervertebral disc of lumbar region with discogenic back pain              Assessment & Plan  1. Back pain: Chronic.  - Present for 7-8 months, starting around 06/2024. Pain episodes can occur daily, especially with prolonged standing or sitting; sometimes radiates to knees during ambulation.  - Physical therapy every Thursday, lasting 60-90 minutes, reported as helpful; daughter provides beneficial massages.  - Physical exam: mild tightness in back muscles.  - Continue physical therapy and massages.    2. FMLA paperwork.  - Completed during visit.  - Needs time off for physical therapy every Thursday, lasting 60-90 minutes.  - Requires 4-hour window for sessions and travel.  - Copy of completed FMLA form provided.    Follow-up  - as scheduled         Discussed the expected course, resolution and complications of the diagnosis(es) in detail.  Medication risks, benefits, costs, interactions, and alternatives were discussed as indicated.  Patient to contact the office if their condition worsens, changes or fails to improve. Pt verbalized understanding with the diagnosis(es) and plan.           Flakita Jackson DO    04/16/25   2:20 PM        Subjective     CC:   Chief Complaint   Patient presents with    Administrative     FMLA       Pt  has a past medical history of DDD (degenerative disc disease), lumbar.     Fidencio BETSY Ramírez Jr. is a 49 y.o. male presenting to the clinic today for evaluation and/or follow-up of the following concerns:      History of Present Illness  The patient presents for evaluation of back pain and FMLA paperwork.    Back pain has been present for 7-8 months, starting around 06/2024. First occurrence of such discomfort. Pain occasionally radiates to knees

## 2025-04-18 ENCOUNTER — HOSPITAL ENCOUNTER (OUTPATIENT)
Facility: HOSPITAL | Age: 50
Setting detail: RECURRING SERIES
Discharge: HOME | End: 2025-04-21
Attending: STUDENT IN AN ORGANIZED HEALTH CARE EDUCATION/TRAINING PROGRAM
Payer: COMMERCIAL

## 2025-04-18 PROCEDURE — 97110 THERAPEUTIC EXERCISES: CPT

## 2025-04-18 NOTE — PROGRESS NOTES
PHYSICAL THERAPY - MEDICARE DAILY TREATMENT NOTE (updated 3/23)      Date: 2025          Patient Name:  Fidencio Ramírez Jr. :  1975   Medical   Diagnosis:  Other low back pain [M54.59] Treatment Diagnosis:  M54.59  OTHER LOWER BACK PAIN    Referral Source:  Flakita Jackson DO [place]:   [place] / Product Type: *No Product type* /                     Patient  verified yes     Visit #   Current  / Total 3 24   Time   In / Out 1000 1050   Total Treatment Time 50   Total Timed Codes 50   1:1 Treatment Time 50      [facility] Totals Reminder:  bill using total billable   min of TIMED therapeutic procedures and modalities.   8-22 min = 1 unit; 23-37 min = 2 units; 38-52 min = 3 units; 53-67 min = 4 units; 68-82 min = 5 units            SUBJECTIVE    Pain Level (0-10 scale): 0    Any medication changes, allergies to medications, adverse drug reactions, diagnosis change, or new procedure performed?: [x] No    [] Yes (see summary sheet for update)  Medications: Verified on Patient Summary List    Subjective functional status/changes:     Patient noted they have been doing well, noting no symptoms down their legs and some occasional tightness across their lower back.    OBJECTIVE      Therapeutic Procedures:  Tx Min Billable or 1:1 Min (if diff from Tx Min) Procedure, Rationale, Specifics   50  12330 Therapeutic Exercise (timed):  increase ROM, strength, coordination, balance, and proprioception to improve patient's ability to progress to PLOF and address remaining functional goals. (see flow sheet as applicable)     Details if applicable:                         50     Total Total         [x]  Patient Education billed concurrently with other procedures   [x] Review HEP    [] Progressed/Changed HEP, detail:    [] Other detail:         Other Objective/Functional Measures  NA    Pain Level at end of session (0-10 scale): 0      Assessment   Patient tolerated treatment session well today,

## 2025-04-24 ENCOUNTER — HOSPITAL ENCOUNTER (OUTPATIENT)
Facility: HOSPITAL | Age: 50
Setting detail: RECURRING SERIES
Discharge: HOME | End: 2025-04-27
Attending: STUDENT IN AN ORGANIZED HEALTH CARE EDUCATION/TRAINING PROGRAM
Payer: COMMERCIAL

## 2025-04-24 PROCEDURE — 97110 THERAPEUTIC EXERCISES: CPT

## 2025-04-24 NOTE — PROGRESS NOTES
PHYSICAL THERAPY - MEDICARE DAILY TREATMENT NOTE (updated 3/23)      Date: 2025          Patient Name:  Fidencio Ramírez Jr. :  1975   Medical   Diagnosis:  Other low back pain [M54.59] Treatment Diagnosis:  M54.59  OTHER LOWER BACK PAIN    Referral Source:  Flakita Jackson DO Insurance:   [facility] [place] / Product Type: *No Product type* /                     Patient  verified yes     Visit #   Current  / Total 4 24   Time   In / Out 1000 1049   Total Treatment Time 49   Total Timed Codes 49   1:1 Treatment Time 49      [facility] Totals Reminder:  bill using total billable   min of TIMED therapeutic procedures and modalities.   8-22 min = 1 unit; 23-37 min = 2 units; 38-52 min = 3 units; 53-67 min = 4 units; 68-82 min = 5 units            SUBJECTIVE    Pain Level (0-10 scale): 0    Any medication changes, allergies to medications, adverse drug reactions, diagnosis change, or new procedure performed?: [x] No    [] Yes (see summary sheet for update)  Medications: Verified on Patient Summary List    Subjective functional status/changes:     Patient noted they have not had any pain since last visit and have continued to work on their exercises. Patient also noted the more leg strengthening they do at the gym the better they feel as well, noting they have had less tightness overall and no pain since last visit.     OBJECTIVE      Therapeutic Procedures:  Tx Min Billable or 1:1 Min (if diff from Tx Min) Procedure, Rationale, Specifics   49  13439 Therapeutic Exercise (timed):  increase ROM, strength, coordination, balance, and proprioception to improve patient's ability to progress to PLOF and address remaining functional goals. (see flow sheet as applicable)     Details if applicable:                         49     Total Total         [x]  Patient Education billed concurrently with other procedures   [x] Review HEP    [] Progressed/Changed HEP, detail:    [] Other detail:         Other

## 2025-05-01 ENCOUNTER — HOSPITAL ENCOUNTER (OUTPATIENT)
Facility: HOSPITAL | Age: 50
Setting detail: RECURRING SERIES
Discharge: HOME | End: 2025-05-04
Attending: STUDENT IN AN ORGANIZED HEALTH CARE EDUCATION/TRAINING PROGRAM
Payer: COMMERCIAL

## 2025-05-01 PROCEDURE — 97110 THERAPEUTIC EXERCISES: CPT

## 2025-05-01 NOTE — PROGRESS NOTES
PHYSICAL THERAPY - MEDICARE DAILY TREATMENT NOTE (updated 3/23)      Date: 2025          Patient Name:  Fidencio Ramírez Jr. :  1975   Medical   Diagnosis:  Other low back pain [M54.59] Treatment Diagnosis:  M54.59  OTHER LOWER BACK PAIN    Referral Source:  Flakita Jackson DO Insurance:   [place] / Product Type: *No Product type* /                     Patient  verified yes     Visit #   Current  / Total 5 24   Time   In / Out 1000 1044   Total Treatment Time 44   Total Timed Codes 44   1:1 Treatment Time 44      [facility] Totals Reminder:  bill using total billable   min of TIMED therapeutic procedures and modalities.   8-22 min = 1 unit; 23-37 min = 2 units; 38-52 min = 3 units; 53-67 min = 4 units; 68-82 min = 5 units            SUBJECTIVE    Pain Level (0-10 scale): 0    Any medication changes, allergies to medications, adverse drug reactions, diagnosis change, or new procedure performed?: [x] No    [] Yes (see summary sheet for update)  Medications: Verified on Patient Summary List    Subjective functional status/changes:     Pt reports that he is about 75% better.  Still complains of discomfort at work.    OBJECTIVE      Therapeutic Procedures:  Tx Min Billable or 1:1 Min (if diff from Tx Min) Procedure, Rationale, Specifics   44  67126 Therapeutic Exercise (timed):  increase ROM, strength, coordination, balance, and proprioception to improve patient's ability to progress to PLOF and address remaining functional goals. (see flow sheet as applicable)     Details if applicable:                         44     Total Total         [x]  Patient Education billed concurrently with other procedures   [x] Review HEP    [] Progressed/Changed HEP, detail:    [] Other detail:         Other Objective/Functional Measures                                                 Lumbar AROM:                                                                                               R

## 2025-05-08 ENCOUNTER — HOSPITAL ENCOUNTER (OUTPATIENT)
Facility: HOSPITAL | Age: 50
Setting detail: RECURRING SERIES
Discharge: HOME | End: 2025-05-11
Attending: STUDENT IN AN ORGANIZED HEALTH CARE EDUCATION/TRAINING PROGRAM
Payer: COMMERCIAL

## 2025-05-08 PROCEDURE — 97110 THERAPEUTIC EXERCISES: CPT

## 2025-05-08 NOTE — PROGRESS NOTES
PHYSICAL THERAPY - MEDICARE DAILY TREATMENT NOTE (updated 3/23)      Date: 2025          Patient Name:  Fidencio Ramírez Jr. :  1975   Medical   Diagnosis:  Other low back pain [M54.59] Treatment Diagnosis:  M54.59  OTHER LOWER BACK PAIN    Referral Source:  Flakita Jackson DO Insurance:   [place] / Product Type: *No Product type* /                     Patient  verified yes     Visit #   Current  / Total 6 24   Time   In / Out 1000 1045   Total Treatment Time 45   Total Timed Codes 45   1:1 Treatment Time 45      [facility] Totals Reminder:  bill using total billable   min of TIMED therapeutic procedures and modalities.   8-22 min = 1 unit; 23-37 min = 2 units; 38-52 min = 3 units; 53-67 min = 4 units; 68-82 min = 5 units            SUBJECTIVE    Pain Level (0-10 scale): 0    Any medication changes, allergies to medications, adverse drug reactions, diagnosis change, or new procedure performed?: [x] No    [] Yes (see summary sheet for update)  Medications: Verified on Patient Summary List    Subjective functional status/changes:     Pt reports that he is about the same and states that it hasn't changed a lot    OBJECTIVE      Therapeutic Procedures:  Tx Min Billable or 1:1 Min (if diff from Tx Min) Procedure, Rationale, Specifics   45  90978 Therapeutic Exercise (timed):  increase ROM, strength, coordination, balance, and proprioception to improve patient's ability to progress to PLOF and address remaining functional goals. (see flow sheet as applicable)     Details if applicable:                         45     Total Total         [x]  Patient Education billed concurrently with other procedures   [x] Review HEP    [] Progressed/Changed HEP, detail:    [] Other detail:         Other Objective/Functional Measures  NA    Pain Level at end of session (0-10 scale): 0      Assessment   Pt is able to tolerate all activity but feels like he continues to have a knot in the low back and

## 2025-05-08 NOTE — PROGRESS NOTES
[facility]  [address]  [place] [address]  Phone: [phone]   Fax: [phone]     PHYSICAL THERAPY PROGRESS NOTE  Patient Name:  Fidencio Ramírez Jr. :  1975   Treatment/Medical Diagnosis: Other low back pain [M54.59]   Referral Source:  Flakita Jackson DO     Date of Initial Visit:  4/3/25 Attended Visits:  6 Missed Visits:  0     SUMMARY OF TREATMENT/ASSESSMENT:  PT has completed 6 sessions of therapy that have focused on restoring core stability and improving strength while eliminating his discomfort.  Pt reports that he is about 75% better but continues to complain of a knot in the low back.  This is predominately occurring at work.  He has improved his strength but does maintain a posture of increased lordosis at rest.  Will continue to work to stabilize the spine and maintain a more neutral positions    CURRENT STATUS/GOALS:    Lumbar AROM:                                                                                               R                      L                        Flexion                                                 100%                                                       Extension                                            100%                                                     Side Bending                           NT                   NT                                             Rotation                                   100%               100%                                               LOWER QUARTER                            MUSCLE STRENGTH  KEY                                                                             R                      L  0 - No Contraction                   L1, L2 Psoas               4                      4  1 - Trace                                  L3 Quads                    5                      5  2 - Poor

## 2025-05-15 ENCOUNTER — HOSPITAL ENCOUNTER (OUTPATIENT)
Facility: HOSPITAL | Age: 50
Setting detail: RECURRING SERIES
Discharge: HOME | End: 2025-05-18
Attending: STUDENT IN AN ORGANIZED HEALTH CARE EDUCATION/TRAINING PROGRAM
Payer: COMMERCIAL

## 2025-05-15 PROCEDURE — 97110 THERAPEUTIC EXERCISES: CPT

## 2025-05-15 NOTE — PROGRESS NOTES
[facility]  [address]  [place] [address]  Phone: [phone]   Fax: [phone]     DISCHARGE SUMMARY  Patient Name: Fidencio Ramírez Jr. : 1975   Treatment/Medical Diagnosis: Other low back pain [M54.59]   Referral Source: Flakita Jackson DO     Date of Initial Visit: 4/3/25 Attended Visits: 7 Missed Visits: 0     SUMMARY OF TREATMENT  Pt has completed 7 sessions of therapy focused on core stability and strength in the low back and gluteals.  He has progressed nicely and is no longer reporting any symptoms with work and states he no longer feels a knot in his back.  All AROM and LE strength are WFL.  At this time he will be Dc'd from skilled care.      CURRENT STATUS    Short Term Goals: To be accomplished in 8-10 treatments.  Pt will be consistent and demonstrate I with HEP MET  Pt will complain of pain 1-2/10 with all activity MEt  Pt will demonstrate improved ROM to complete all ADL's and household chores more efficiently MET  Pt will be able to maintain positions for 60 min without increased symptoms MEt     Long Term Goals: To be accomplished in 20-24 treatments.  Pt will complain of pain 0-1/10 with all activity MEt  Pt will increase strength to stabilize the core and maintain proper postures with all activity MET  Pt will increase FOTO score by 9 points to demonstrate improved function with all activity MET  Pt will return to all gym activity to include lower body exercises and core strengthening MET  Pt will use proper form and posture to complete all work tasks without increased symptoms 100% of the time MET    RECOMMENDATIONS  Discontinue therapy. Progressing towards or have reached established goals.        Roberto Ramírez, PT       5/15/2025       11:02 AM    If you have any questions/comments please contact us directly at [phone].   Thank you for allowing us to assist in the care of your

## 2025-05-15 NOTE — PROGRESS NOTES
PHYSICAL THERAPY - MEDICARE DAILY TREATMENT NOTE (updated 3/23)      Date: 5/15/2025          Patient Name:  Fidencio Ramírez Jr. :  1975   Medical   Diagnosis:  Other low back pain [M54.59] Treatment Diagnosis:  M54.59  OTHER LOWER BACK PAIN    Referral Source:  Flakita Jackson DO Insurance:   [facility] [place] / Product Type: *No Product type* /                     Patient  verified yes     Visit #   Current  / Total 7 24   Time   In / Out 1004 1040   Total Treatment Time 36   Total Timed Codes 36   1:1 Treatment Time 36       [facility] Totals Reminder:  bill using total billable   min of TIMED therapeutic procedures and modalities.   8-22 min = 1 unit; 23-37 min = 2 units; 38-52 min = 3 units; 53-67 min = 4 units; 68-82 min = 5 units            SUBJECTIVE    Pain Level (0-10 scale): 0    Any medication changes, allergies to medications, adverse drug reactions, diagnosis change, or new procedure performed?: [x] No    [] Yes (see summary sheet for update)  Medications: Verified on Patient Summary List    Subjective functional status/changes:   Pt states that over the last week he no longer has the knot in his back and has no complaints   OBJECTIVE      Therapeutic Procedures:  Tx Min Billable or 1:1 Min (if diff from Tx Min) Procedure, Rationale, Specifics   36  94261 Therapeutic Exercise (timed):  increase ROM, strength, coordination, balance, and proprioception to improve patient's ability to progress to PLOF and address remaining functional goals. (see flow sheet as applicable)     Details if applicable:                         36     Total Total         [x]  Patient Education billed concurrently with other procedures   [x] Review HEP    [] Progressed/Changed HEP, detail:    [] Other detail:         Other Objective/Functional Measures  NA    Pain Level at end of session (0-10 scale): 0      Assessment   Pt has progressed well and is no longer experiencing any symptoms despite an increase in

## 2025-05-22 ENCOUNTER — APPOINTMENT (OUTPATIENT)
Facility: HOSPITAL | Age: 50
End: 2025-05-22
Attending: STUDENT IN AN ORGANIZED HEALTH CARE EDUCATION/TRAINING PROGRAM
Payer: COMMERCIAL

## 2025-05-29 ENCOUNTER — APPOINTMENT (OUTPATIENT)
Facility: HOSPITAL | Age: 50
End: 2025-05-29
Attending: STUDENT IN AN ORGANIZED HEALTH CARE EDUCATION/TRAINING PROGRAM
Payer: COMMERCIAL

## 2025-06-06 RX ORDER — LEVOTHYROXINE SODIUM 25 UG/1
25 TABLET ORAL DAILY
Qty: 90 TABLET | Refills: 1 | Status: SHIPPED | OUTPATIENT
Start: 2025-06-06

## 2025-07-16 LAB
T4 FREE SERPL-MCNC: 1.17 NG/DL (ref 0.82–1.77)
TSH SERPL DL<=0.005 MIU/L-ACNC: 3.71 UIU/ML (ref 0.45–4.5)
TSI SER-ACNC: 16.5 IU/L (ref 0–0.55)

## 2025-07-18 ENCOUNTER — OFFICE VISIT (OUTPATIENT)
Age: 50
End: 2025-07-18
Payer: COMMERCIAL

## 2025-07-18 VITALS
HEIGHT: 69 IN | DIASTOLIC BLOOD PRESSURE: 67 MMHG | HEART RATE: 70 BPM | WEIGHT: 183 LBS | SYSTOLIC BLOOD PRESSURE: 118 MMHG | BODY MASS INDEX: 27.11 KG/M2

## 2025-07-18 DIAGNOSIS — E74.39 GLUCOSE INTOLERANCE: ICD-10-CM

## 2025-07-18 DIAGNOSIS — E06.3 AUTOIMMUNE THYROIDITIS: Primary | ICD-10-CM

## 2025-07-18 PROCEDURE — G2211 COMPLEX E/M VISIT ADD ON: HCPCS | Performed by: INTERNAL MEDICINE

## 2025-07-18 PROCEDURE — 99214 OFFICE O/P EST MOD 30 MIN: CPT | Performed by: INTERNAL MEDICINE

## 2025-07-18 NOTE — PROGRESS NOTES
of subclinical hypOthyroidism and persistently elevated TSH.      #Grave's disease with positive TPO Ab  -TSI dropping as of 07/2025, remission in next 6-12 months likely   -with subclinical hypOthyroidism 08/2024- started levothyroxine 25mcg with normalization in TSH thereafter   -clinical course is unpredictable, remain wary of signs for hypERthyroidism- see above   -not interested in I131 with subsequent levothyroxine requirement   -not interested in thyroidectomy at this time   -Check labs every 6 months given stability    #HbA1c 6.1% 09/2024  -repeat 12/2024 same  -provided dexcom g7 sample      Copy sent to:Rex Milian MD      RTC 4 mo      Tori Ling MD   [facility]